# Patient Record
Sex: MALE | Race: WHITE | NOT HISPANIC OR LATINO | Employment: OTHER | URBAN - METROPOLITAN AREA
[De-identification: names, ages, dates, MRNs, and addresses within clinical notes are randomized per-mention and may not be internally consistent; named-entity substitution may affect disease eponyms.]

---

## 2017-02-02 PROBLEM — E66.9 OBESITY: Status: ACTIVE | Noted: 2017-02-02

## 2017-10-02 PROBLEM — M10.9 GOUT: Status: ACTIVE | Noted: 2017-10-02

## 2018-09-14 PROBLEM — M10.9 GOUT: Status: RESOLVED | Noted: 2017-10-02 | Resolved: 2018-09-14

## 2019-10-01 PROBLEM — G47.33 OBSTRUCTIVE SLEEP APNEA SYNDROME: Status: ACTIVE | Noted: 2019-10-01

## 2019-10-24 PROBLEM — R25.1 TREMORS OF NERVOUS SYSTEM: Status: ACTIVE | Noted: 2019-10-24

## 2020-03-25 PROBLEM — G20.A1 PARKINSON'S DISEASE (HCC): Status: ACTIVE | Noted: 2020-02-10

## 2022-11-27 ENCOUNTER — APPOINTMENT (OUTPATIENT)
Dept: RADIOLOGY | Facility: MEDICAL CENTER | Age: 67
End: 2022-11-27
Attending: STUDENT IN AN ORGANIZED HEALTH CARE EDUCATION/TRAINING PROGRAM
Payer: COMMERCIAL

## 2022-11-27 ENCOUNTER — APPOINTMENT (OUTPATIENT)
Dept: RADIOLOGY | Facility: MEDICAL CENTER | Age: 67
End: 2022-11-27
Attending: EMERGENCY MEDICINE
Payer: COMMERCIAL

## 2022-11-27 ENCOUNTER — HOSPITAL ENCOUNTER (OUTPATIENT)
Facility: MEDICAL CENTER | Age: 67
End: 2022-11-28
Attending: EMERGENCY MEDICINE | Admitting: STUDENT IN AN ORGANIZED HEALTH CARE EDUCATION/TRAINING PROGRAM
Payer: COMMERCIAL

## 2022-11-27 DIAGNOSIS — G20.A1 PARKINSON'S DISEASE (HCC): ICD-10-CM

## 2022-11-27 DIAGNOSIS — R55 SYNCOPE, UNSPECIFIED SYNCOPE TYPE: ICD-10-CM

## 2022-11-27 PROBLEM — Z66 DNR (DO NOT RESUSCITATE): Status: ACTIVE | Noted: 2022-11-27

## 2022-11-27 PROBLEM — K59.01 SLOW TRANSIT CONSTIPATION: Status: ACTIVE | Noted: 2022-11-27

## 2022-11-27 PROBLEM — R68.89 SPELLS OF DECREASED ATTENTIVENESS: Status: ACTIVE | Noted: 2022-11-27

## 2022-11-27 LAB
ALBUMIN SERPL BCP-MCNC: 4.6 G/DL (ref 3.2–4.9)
ALBUMIN/GLOB SERPL: 1.5 G/DL
ALP SERPL-CCNC: 64 U/L (ref 30–99)
ALT SERPL-CCNC: 19 U/L (ref 2–50)
ANION GAP SERPL CALC-SCNC: 13 MMOL/L (ref 7–16)
AST SERPL-CCNC: 17 U/L (ref 12–45)
BASOPHILS # BLD AUTO: 1 % (ref 0–1.8)
BASOPHILS # BLD: 0.05 K/UL (ref 0–0.12)
BILIRUB SERPL-MCNC: 0.7 MG/DL (ref 0.1–1.5)
BUN SERPL-MCNC: 21 MG/DL (ref 8–22)
CALCIUM SERPL-MCNC: 9.7 MG/DL (ref 8.5–10.5)
CHLORIDE SERPL-SCNC: 103 MMOL/L (ref 96–112)
CO2 SERPL-SCNC: 24 MMOL/L (ref 20–33)
CREAT SERPL-MCNC: 1.23 MG/DL (ref 0.5–1.4)
EKG IMPRESSION: NORMAL
EOSINOPHIL # BLD AUTO: 0.27 K/UL (ref 0–0.51)
EOSINOPHIL NFR BLD: 5.2 % (ref 0–6.9)
ERYTHROCYTE [DISTWIDTH] IN BLOOD BY AUTOMATED COUNT: 42.7 FL (ref 35.9–50)
GFR SERPLBLD CREATININE-BSD FMLA CKD-EPI: 64 ML/MIN/1.73 M 2
GLOBULIN SER CALC-MCNC: 3.1 G/DL (ref 1.9–3.5)
GLUCOSE SERPL-MCNC: 92 MG/DL (ref 65–99)
HCT VFR BLD AUTO: 43.3 % (ref 42–52)
HGB BLD-MCNC: 14.2 G/DL (ref 14–18)
IMM GRANULOCYTES # BLD AUTO: 0.02 K/UL (ref 0–0.11)
IMM GRANULOCYTES NFR BLD AUTO: 0.4 % (ref 0–0.9)
LYMPHOCYTES # BLD AUTO: 1.59 K/UL (ref 1–4.8)
LYMPHOCYTES NFR BLD: 30.8 % (ref 22–41)
MCH RBC QN AUTO: 28.5 PG (ref 27–33)
MCHC RBC AUTO-ENTMCNC: 32.8 G/DL (ref 33.7–35.3)
MCV RBC AUTO: 86.9 FL (ref 81.4–97.8)
MONOCYTES # BLD AUTO: 0.33 K/UL (ref 0–0.85)
MONOCYTES NFR BLD AUTO: 6.4 % (ref 0–13.4)
NEUTROPHILS # BLD AUTO: 2.9 K/UL (ref 1.82–7.42)
NEUTROPHILS NFR BLD: 56.2 % (ref 44–72)
NRBC # BLD AUTO: 0 K/UL
NRBC BLD-RTO: 0 /100 WBC
PLATELET # BLD AUTO: 214 K/UL (ref 164–446)
PMV BLD AUTO: 9.8 FL (ref 9–12.9)
POTASSIUM SERPL-SCNC: 4.2 MMOL/L (ref 3.6–5.5)
PROT SERPL-MCNC: 7.7 G/DL (ref 6–8.2)
RBC # BLD AUTO: 4.98 M/UL (ref 4.7–6.1)
SODIUM SERPL-SCNC: 140 MMOL/L (ref 135–145)
TROPONIN T SERPL-MCNC: 7 NG/L (ref 6–19)
TROPONIN T SERPL-MCNC: 9 NG/L (ref 6–19)
WBC # BLD AUTO: 5.2 K/UL (ref 4.8–10.8)

## 2022-11-27 PROCEDURE — 93005 ELECTROCARDIOGRAM TRACING: CPT | Performed by: EMERGENCY MEDICINE

## 2022-11-27 PROCEDURE — G0378 HOSPITAL OBSERVATION PER HR: HCPCS

## 2022-11-27 PROCEDURE — 71045 X-RAY EXAM CHEST 1 VIEW: CPT

## 2022-11-27 PROCEDURE — 93005 ELECTROCARDIOGRAM TRACING: CPT

## 2022-11-27 PROCEDURE — 85025 COMPLETE CBC W/AUTO DIFF WBC: CPT

## 2022-11-27 PROCEDURE — A9576 INJ PROHANCE MULTIPACK: HCPCS | Performed by: STUDENT IN AN ORGANIZED HEALTH CARE EDUCATION/TRAINING PROGRAM

## 2022-11-27 PROCEDURE — 80053 COMPREHEN METABOLIC PANEL: CPT

## 2022-11-27 PROCEDURE — 700102 HCHG RX REV CODE 250 W/ 637 OVERRIDE(OP): Performed by: STUDENT IN AN ORGANIZED HEALTH CARE EDUCATION/TRAINING PROGRAM

## 2022-11-27 PROCEDURE — 36415 COLL VENOUS BLD VENIPUNCTURE: CPT

## 2022-11-27 PROCEDURE — 70450 CT HEAD/BRAIN W/O DYE: CPT

## 2022-11-27 PROCEDURE — 84484 ASSAY OF TROPONIN QUANT: CPT

## 2022-11-27 PROCEDURE — 70553 MRI BRAIN STEM W/O & W/DYE: CPT

## 2022-11-27 PROCEDURE — A9270 NON-COVERED ITEM OR SERVICE: HCPCS | Performed by: STUDENT IN AN ORGANIZED HEALTH CARE EDUCATION/TRAINING PROGRAM

## 2022-11-27 PROCEDURE — 99220 PR INITIAL OBSERVATION CARE,LEVL III: CPT | Performed by: STUDENT IN AN ORGANIZED HEALTH CARE EDUCATION/TRAINING PROGRAM

## 2022-11-27 PROCEDURE — 700117 HCHG RX CONTRAST REV CODE 255: Performed by: STUDENT IN AN ORGANIZED HEALTH CARE EDUCATION/TRAINING PROGRAM

## 2022-11-27 PROCEDURE — 93971 EXTREMITY STUDY: CPT | Mod: LT

## 2022-11-27 PROCEDURE — 99285 EMERGENCY DEPT VISIT HI MDM: CPT

## 2022-11-27 RX ORDER — POLYETHYLENE GLYCOL 3350 17 G/17G
1 POWDER, FOR SOLUTION ORAL DAILY
Status: DISCONTINUED | OUTPATIENT
Start: 2022-11-27 | End: 2022-11-28 | Stop reason: HOSPADM

## 2022-11-27 RX ORDER — LORAZEPAM 2 MG/1
2 TABLET ORAL ONCE
Status: COMPLETED | OUTPATIENT
Start: 2022-11-27 | End: 2022-11-27

## 2022-11-27 RX ORDER — ACETAMINOPHEN 325 MG/1
650 TABLET ORAL EVERY 6 HOURS PRN
Status: DISCONTINUED | OUTPATIENT
Start: 2022-11-27 | End: 2022-11-28 | Stop reason: HOSPADM

## 2022-11-27 RX ORDER — ATORVASTATIN CALCIUM 10 MG/1
10 TABLET, FILM COATED ORAL DAILY
Status: DISCONTINUED | OUTPATIENT
Start: 2022-11-28 | End: 2022-11-27

## 2022-11-27 RX ORDER — POLYETHYLENE GLYCOL 3350 17 G/17G
17 POWDER, FOR SOLUTION ORAL DAILY
COMMUNITY
End: 2023-09-21

## 2022-11-27 RX ORDER — POLYETHYLENE GLYCOL 3350 17 G/17G
1 POWDER, FOR SOLUTION ORAL
Status: DISCONTINUED | OUTPATIENT
Start: 2022-11-27 | End: 2022-11-28 | Stop reason: HOSPADM

## 2022-11-27 RX ORDER — TRAZODONE HYDROCHLORIDE 50 MG/1
50 TABLET ORAL
Status: DISCONTINUED | OUTPATIENT
Start: 2022-11-27 | End: 2022-11-28 | Stop reason: HOSPADM

## 2022-11-27 RX ORDER — AMOXICILLIN 250 MG
2 CAPSULE ORAL 2 TIMES DAILY
Status: DISCONTINUED | OUTPATIENT
Start: 2022-11-27 | End: 2022-11-28 | Stop reason: HOSPADM

## 2022-11-27 RX ORDER — ENOXAPARIN SODIUM 100 MG/ML
40 INJECTION SUBCUTANEOUS DAILY
Status: DISCONTINUED | OUTPATIENT
Start: 2022-11-27 | End: 2022-11-28 | Stop reason: HOSPADM

## 2022-11-27 RX ORDER — BISACODYL 10 MG
10 SUPPOSITORY, RECTAL RECTAL
Status: DISCONTINUED | OUTPATIENT
Start: 2022-11-27 | End: 2022-11-28 | Stop reason: HOSPADM

## 2022-11-27 RX ORDER — ATORVASTATIN CALCIUM 10 MG/1
10 TABLET, FILM COATED ORAL EVERY EVENING
Status: DISCONTINUED | OUTPATIENT
Start: 2022-11-27 | End: 2022-11-28 | Stop reason: HOSPADM

## 2022-11-27 RX ORDER — IBUPROFEN 800 MG/1
800 TABLET ORAL EVERY EVENING
Status: DISCONTINUED | OUTPATIENT
Start: 2022-11-27 | End: 2022-11-28 | Stop reason: HOSPADM

## 2022-11-27 RX ADMIN — ATORVASTATIN CALCIUM 10 MG: 10 TABLET, FILM COATED ORAL at 22:50

## 2022-11-27 RX ADMIN — GADOTERIDOL 20 ML: 279.3 INJECTION, SOLUTION INTRAVENOUS at 20:03

## 2022-11-27 RX ADMIN — IBUPROFEN 800 MG: 800 TABLET, FILM COATED ORAL at 22:50

## 2022-11-27 RX ADMIN — LORAZEPAM 2 MG: 2 TABLET ORAL at 18:42

## 2022-11-27 RX ADMIN — TRAZODONE HYDROCHLORIDE 50 MG: 50 TABLET ORAL at 22:50

## 2022-11-27 ASSESSMENT — LIFESTYLE VARIABLES
AVERAGE NUMBER OF DAYS PER WEEK YOU HAVE A DRINK CONTAINING ALCOHOL: 0
EVER FELT BAD OR GUILTY ABOUT YOUR DRINKING: NO
ALCOHOL_USE: YES
HOW MANY TIMES IN THE PAST YEAR HAVE YOU HAD 5 OR MORE DRINKS IN A DAY: 0
HAVE YOU EVER FELT YOU SHOULD CUT DOWN ON YOUR DRINKING: NO
ON A TYPICAL DAY WHEN YOU DRINK ALCOHOL HOW MANY DRINKS DO YOU HAVE: 1
TOTAL SCORE: 0
TOTAL SCORE: 0
DOES PATIENT WANT TO STOP DRINKING: NO
EVER HAD A DRINK FIRST THING IN THE MORNING TO STEADY YOUR NERVES TO GET RID OF A HANGOVER: NO
CONSUMPTION TOTAL: NEGATIVE
HAVE PEOPLE ANNOYED YOU BY CRITICIZING YOUR DRINKING: NO
TOTAL SCORE: 0

## 2022-11-27 ASSESSMENT — ENCOUNTER SYMPTOMS
FOCAL WEAKNESS: 0
BRUISES/BLEEDS EASILY: 0
SORE THROAT: 0
SHORTNESS OF BREATH: 0
VOMITING: 0
DIARRHEA: 0
NECK PAIN: 0
HEADACHES: 0
COUGH: 0
ABDOMINAL PAIN: 0
BACK PAIN: 0
WEAKNESS: 1
DIZZINESS: 0
FEVER: 0
MYALGIAS: 1
EYE REDNESS: 0
SEIZURES: 1
NAUSEA: 0
CHILLS: 0
MEMORY LOSS: 0
CONSTIPATION: 0
LOSS OF CONSCIOUSNESS: 1
BLURRED VISION: 0
DOUBLE VISION: 0
PALPITATIONS: 0
NERVOUS/ANXIOUS: 0
INSOMNIA: 1

## 2022-11-27 ASSESSMENT — FIBROSIS 4 INDEX: FIB4 SCORE: 1.24

## 2022-11-27 ASSESSMENT — PATIENT HEALTH QUESTIONNAIRE - PHQ9
2. FEELING DOWN, DEPRESSED, IRRITABLE, OR HOPELESS: NOT AT ALL
1. LITTLE INTEREST OR PLEASURE IN DOING THINGS: NOT AT ALL
SUM OF ALL RESPONSES TO PHQ9 QUESTIONS 1 AND 2: 0

## 2022-11-27 NOTE — ED NOTES
Chief Complaint   Patient presents with    Near Syncopal     X3 with excessive activities such as PT     Pt wheeled to triage with above complaints [id number]. Pt going to PT for his left knee. They noted his blood pressure has been high after the near syncopal episodes.   Pt to ekg.

## 2022-11-27 NOTE — ED NOTES
Pt resting in room, easy, equal chest rise and fall. Pt remains calm and cooperative. Family at bedside

## 2022-11-27 NOTE — PROGRESS NOTES
Brief phone conversation with ERP    67-year-old male known Parkinson disease has been having episodes of syncope or seizures for the past few months now.  Had 3 episodes.  All of them seem to be related with some type of physical activity such as standing or during PT sessions.  Last 1 was this past week he had some tongue biting with it.  At this time it is unclear with his episodes are.  Could be syncope or seizures.  Patients with Parkinson's can get autonomic dysfunction.  I agree with the ERP's plan to have the patient admitted by the hospital service and do a syncope work-up which includes an MRI brain with without contrast, EEG, echo, orthostatic checks, and telemetry.  If anything is positive or the team has any further questions please reach out to neurology.  Would not empirically treat for seizures at this time.  If everything is unremarkable patient can follow-up outpatient neurology.

## 2022-11-27 NOTE — ED NOTES
Med rec completed per patient at bedside.    No abx in the last 30 days.    Allergies reviewed.    Home pharmacy is [facility] in [place].

## 2022-11-27 NOTE — ED PROVIDER NOTES
ED Provider Note    CHIEF COMPLAINT  Chief Complaint   Patient presents with    Near Syncopal     X3 with excessive activities such as PT       HPI  Erasto Aguilar is a 67 y.o. male with a history of Parkinson's who presents to the emergency department following reported syncopal episode.  The patient has had 3 of these episodes over the last few months.  He recently had surgery for a quadriceps muscle repair in August.  The first episode happened after he was walking around the house in September and thought would just to be related with exertion.  He has had 2 other episodes during physical therapy the last happened last Tuesday.  Initially it was described as syncope with maybe some gargling.  This last episode on Tuesday included tongue biting and urinary incontinence as well as the right side of the body being stiff.  The patient has no history of seizures in the past.  He denies any headache, chest pain, shortness of breath.  He is seen by a neurologist at [facility] in [place] however now lives in [place] and has not established with a neurologist    REVIEW OF SYSTEMS  See HPI for further details.   Review of Systems   Constitutional:  Negative for chills and fever.   HENT:  Negative for sore throat.    Eyes:  Negative for blurred vision and redness.   Respiratory:  Negative for cough and shortness of breath.    Cardiovascular:  Negative for chest pain and leg swelling.   Gastrointestinal:  Negative for abdominal pain and vomiting.   Genitourinary:  Negative for dysuria and urgency.   Musculoskeletal:  Negative for back pain and neck pain.   Skin:  Negative for rash.   Neurological:  Positive for seizures and loss of consciousness. Negative for focal weakness and headaches.   Psychiatric/Behavioral:  Negative for suicidal ideas.        PAST MEDICAL HISTORY   has a past medical history of Bursitis, Hyperlipidemia, Muscle disorder, and Parkinson disease (HCC).    SOCIAL HISTORY  Social History  "    Tobacco Use    Smoking status: Never    Smokeless tobacco: Never   Vaping Use    Vaping Use: Never used   Substance and Sexual Activity    Alcohol use: Not Currently     Alcohol/week: 0.0 oz     Comment: occasionallyy    Drug use: No    Sexual activity: Not on file       SURGICAL HISTORY   has a past surgical history that includes cholecystectomy and hamstring tendon repair (Left, 8/25/2022).    CURRENT MEDICATIONS  Home Medications       Reviewed by Jeff Ny (Pharmacy Intern) on 11/27/22 at 1407  Med List Status: Complete     Medication Last Dose Status   atorvastatin (LIPITOR) 10 MG Tab 11/26/2022 Active   ibuprofen (MOTRIN) 800 MG Tab 11/26/2022 Active   polyethylene glycol/lytes (MIRALAX) 17 g Pack 11/25/2022 Active                    ALLERGIES  Allergies   Allergen Reactions    Pcn [Penicillins] Anaphylaxis     Had throat swelling with penicillin around 30 years ago.       PHYSICAL EXAM   VITAL SIGNS: BP (!) 142/78   Pulse 67   Temp 36.1 °C (97 °F) (Temporal)   Resp 13   Ht 1.854 m (6' 1\")   Wt 102 kg (225 lb)   SpO2 99%   BMI 29.69 kg/m²      Physical Exam  Constitutional:       General: He is not in acute distress.     Comments: Nontoxic appearing male   HENT:      Head: Normocephalic and atraumatic.   Eyes:      Conjunctiva/sclera: Conjunctivae normal.      Pupils: Pupils are equal, round, and reactive to light.   Cardiovascular:      Rate and Rhythm: Normal rate and regular rhythm.      Heart sounds: Normal heart sounds.   Pulmonary:      Effort: Pulmonary effort is normal. No respiratory distress.      Breath sounds: Normal breath sounds.   Abdominal:      General: There is no distension.      Palpations: Abdomen is soft.      Tenderness: There is no abdominal tenderness.   Musculoskeletal:         General: No tenderness. Normal range of motion.      Cervical back: Normal range of motion and neck supple.   Skin:     General: Skin is warm and dry.   Neurological:      Mental Status: " He is alert and oriented to person, place, and time.      Comments: Moving all extremities spontaneously   Psychiatric:         Mood and Affect: Affect normal.         DIAGNOSTIC STUDIES    EKG  Results for orders placed or performed during the hospital encounter of 22   EKG (NOW)   Result Value Ref Range    Report       [facility] Emergency Dept.    Test Date:  2022  Pt Name:    MISTY MAGALLON                 Department: ER  MRN:        [id number]                      Room:  Gender:     Male                         Technician: [id number]  :        1955                   Requested By:ER TRIAGE PROTOCOL  Order #:    [id number]                    Reading MD: Luz Turner MD    Measurements  Intervals                                Axis  Rate:       59                           P:          67  AL:         147                          QRS:        65  QRSD:       108                          T:          63  QT:         430  QTc:        426    Interpretive Statements  Sinus bradycardia  Normal intervals, no ectopy  No ST or T wave change  No previous ECG available for comparison  Electronically Signed On 2022 13:44:44 PST by Luz Turner MD             LABS  Personally reviewed by me  Labs Reviewed   CBC WITH DIFFERENTIAL - Abnormal; Notable for the following components:       Result Value    MCHC 32.8 (*)     All other components within normal limits    Narrative:     Biotin intake of greater than 5 mg per day may interfere with  troponin levels, causing false low values.   COMP METABOLIC PANEL    Narrative:     Biotin intake of greater than 5 mg per day may interfere with  troponin levels, causing false low values.   TROPONIN    Narrative:     Biotin intake of greater than 5 mg per day may interfere with  troponin levels, causing false low values.   TROPONIN    Narrative:     Biotin intake of greater than 5 mg per day may interfere with  troponin levels, causing false low values.    ESTIMATED GFR    Narrative:     Biotin intake of greater than 5 mg per day may interfere with  troponin levels, causing false low values.   D-DIMER           RADIOLOGY  Personally reviewed by me  US-EXTREMITY VENOUS LOWER UNILAT LEFT   Final Result      CT-HEAD W/O   Final Result         1. No acute intracranial abnormality. No evidence of acute intracranial hemorrhage or mass lesion.                     DX-CHEST-PORTABLE (1 VIEW)   Final Result         1. No acute cardiopulmonary abnormalities are identified.      MR-BRAIN-WITH & W/O    (Results Pending)   EC-ECHOCARDIOGRAM COMPLETE W/O CONT    (Results Pending)         ED COURSE  Vitals:    11/27/22 1401 11/27/22 1501 11/27/22 1701 11/27/22 1801   BP: (!) 155/86 134/82 (!) 152/76 (!) 142/78   Pulse: 75 66 68 67   Resp: 13 16 13 13   Temp:       TempSrc:       SpO2: 100% 96% 96% 99%   Weight:       Height:             Medications administered:        Old records personally reviewed:  Reviewed physical therapy notes which states that the patient felt dizzy prior to this episode.  Blood pressure and heart rate were borderline low following the episode, did have urinary incontinence        MEDICAL DECISION MAKING  Patient with history of Parkinson's who presents after multiple episodes of possible syncope over the last 2 months, mainly occurring with some activity or physical therapy.  He is afebrile with reassuring vital signs on arrival.  His EKG does not show evidence of ischemia or arrhythmia.  No signs of prolonged QT, WPW, HOCM, Brugada.  Labs are reassuring without electrolyte abnormality or anemia.  Troponin is normal making ACS unlikely.  He does not have any symptoms suggestive of pulmonary embolism.  Imaging without evidence of intracranial hemorrhage or mass.  History was initially somewhat concerning for seizure as he reported tongue biting and incontinence however then after more history seems like had a prodrome to these events and only occurring with  exertion which would go along more with syncope.    I did discuss the case with Dr. Shetty briefly with neurology.  He feels that the description of symptoms seem more related with syncope rather than seizure.  He feels it is reasonable to obtain an MRI of the brain and an EEG and consult neurology if abnormal.  He may have autonomic dysreflexia related to his Parkinson's disease.    Upon reassessment, patient is resting comfortably with normal vital signs.  No new complaints at this time.  Discussed results with patient and/or family as well as plan of care for admission for further work-up of recurrent syncope versus seizure.  Patient is agreeable at this time.  I discussed the case with Dr. Salas, hospitalist on-call, who accepts admission of the patient.        IMPRESSION  (R55) Syncope, unspecified syncope type      Disposition: Admit medicine, guarded condition    The patient is referred to a primary physician for blood pressure management, diabetic screening, and for all other preventative health concerns.    New Prescriptions    No medications on file           Electronically signed by: Luz Turner M.D., 11/27/2022 1:45 PM

## 2022-11-28 ENCOUNTER — APPOINTMENT (OUTPATIENT)
Dept: CARDIOLOGY | Facility: MEDICAL CENTER | Age: 67
End: 2022-11-28
Attending: STUDENT IN AN ORGANIZED HEALTH CARE EDUCATION/TRAINING PROGRAM
Payer: COMMERCIAL

## 2022-11-28 VITALS
HEART RATE: 57 BPM | OXYGEN SATURATION: 100 % | HEIGHT: 73 IN | SYSTOLIC BLOOD PRESSURE: 128 MMHG | TEMPERATURE: 98.6 F | BODY MASS INDEX: 27.17 KG/M2 | RESPIRATION RATE: 17 BRPM | WEIGHT: 205.03 LBS | DIASTOLIC BLOOD PRESSURE: 60 MMHG

## 2022-11-28 LAB
ALBUMIN SERPL BCP-MCNC: 3.6 G/DL (ref 3.2–4.9)
ALBUMIN/GLOB SERPL: 1.4 G/DL
ALP SERPL-CCNC: 52 U/L (ref 30–99)
ALT SERPL-CCNC: 15 U/L (ref 2–50)
ANION GAP SERPL CALC-SCNC: 12 MMOL/L (ref 7–16)
APPEARANCE UR: CLEAR
AST SERPL-CCNC: 13 U/L (ref 12–45)
BILIRUB SERPL-MCNC: 0.5 MG/DL (ref 0.1–1.5)
BILIRUB UR QL STRIP.AUTO: NEGATIVE
BUN SERPL-MCNC: 20 MG/DL (ref 8–22)
CALCIUM SERPL-MCNC: 8.9 MG/DL (ref 8.5–10.5)
CHLORIDE SERPL-SCNC: 105 MMOL/L (ref 96–112)
CO2 SERPL-SCNC: 22 MMOL/L (ref 20–33)
COLOR UR: YELLOW
CREAT SERPL-MCNC: 1.22 MG/DL (ref 0.5–1.4)
D DIMER PPP IA.FEU-MCNC: 1.52 UG/ML (FEU) (ref 0–0.5)
GFR SERPLBLD CREATININE-BSD FMLA CKD-EPI: 64 ML/MIN/1.73 M 2
GLOBULIN SER CALC-MCNC: 2.5 G/DL (ref 1.9–3.5)
GLUCOSE SERPL-MCNC: 95 MG/DL (ref 65–99)
GLUCOSE UR STRIP.AUTO-MCNC: NEGATIVE MG/DL
KETONES UR STRIP.AUTO-MCNC: NEGATIVE MG/DL
LEUKOCYTE ESTERASE UR QL STRIP.AUTO: NEGATIVE
LV EJECT FRACT  99904: 65
LV EJECT FRACT MOD 2C 99903: 61.58
LV EJECT FRACT MOD 4C 99902: 68.35
LV EJECT FRACT MOD BP 99901: 65.87
MAGNESIUM SERPL-MCNC: 2.1 MG/DL (ref 1.5–2.5)
MICRO URNS: NORMAL
NITRITE UR QL STRIP.AUTO: NEGATIVE
PH UR STRIP.AUTO: 6 [PH] (ref 5–8)
POTASSIUM SERPL-SCNC: 3.7 MMOL/L (ref 3.6–5.5)
PROT SERPL-MCNC: 6.1 G/DL (ref 6–8.2)
PROT UR QL STRIP: NEGATIVE MG/DL
RBC UR QL AUTO: NEGATIVE
SODIUM SERPL-SCNC: 139 MMOL/L (ref 135–145)
SP GR UR STRIP.AUTO: >=1.03
UROBILINOGEN UR STRIP.AUTO-MCNC: 0.2 MG/DL

## 2022-11-28 PROCEDURE — 93306 TTE W/DOPPLER COMPLETE: CPT | Mod: 26 | Performed by: INTERNAL MEDICINE

## 2022-11-28 PROCEDURE — 81003 URINALYSIS AUTO W/O SCOPE: CPT

## 2022-11-28 PROCEDURE — 80053 COMPREHEN METABOLIC PANEL: CPT

## 2022-11-28 PROCEDURE — 700105 HCHG RX REV CODE 258: Performed by: INTERNAL MEDICINE

## 2022-11-28 PROCEDURE — 93306 TTE W/DOPPLER COMPLETE: CPT

## 2022-11-28 PROCEDURE — 85379 FIBRIN DEGRADATION QUANT: CPT

## 2022-11-28 PROCEDURE — 95819 EEG AWAKE AND ASLEEP: CPT | Mod: 26 | Performed by: STUDENT IN AN ORGANIZED HEALTH CARE EDUCATION/TRAINING PROGRAM

## 2022-11-28 PROCEDURE — 83735 ASSAY OF MAGNESIUM: CPT

## 2022-11-28 PROCEDURE — 95819 EEG AWAKE AND ASLEEP: CPT | Performed by: STUDENT IN AN ORGANIZED HEALTH CARE EDUCATION/TRAINING PROGRAM

## 2022-11-28 PROCEDURE — 99217 PR OBSERVATION CARE DISCHARGE: CPT | Performed by: INTERNAL MEDICINE

## 2022-11-28 PROCEDURE — G0378 HOSPITAL OBSERVATION PER HR: HCPCS

## 2022-11-28 RX ORDER — SODIUM CHLORIDE, SODIUM LACTATE, POTASSIUM CHLORIDE, AND CALCIUM CHLORIDE .6; .31; .03; .02 G/100ML; G/100ML; G/100ML; G/100ML
1000 INJECTION, SOLUTION INTRAVENOUS ONCE
Status: COMPLETED | OUTPATIENT
Start: 2022-11-28 | End: 2022-11-28

## 2022-11-28 RX ADMIN — SODIUM CHLORIDE, POTASSIUM CHLORIDE, SODIUM LACTATE AND CALCIUM CHLORIDE 1000 ML: 600; 310; 30; 20 INJECTION, SOLUTION INTRAVENOUS at 08:30

## 2022-11-28 ASSESSMENT — PATIENT HEALTH QUESTIONNAIRE - PHQ9
1. LITTLE INTEREST OR PLEASURE IN DOING THINGS: NOT AT ALL
SUM OF ALL RESPONSES TO PHQ9 QUESTIONS 1 AND 2: 0
2. FEELING DOWN, DEPRESSED, IRRITABLE, OR HOPELESS: NOT AT ALL

## 2022-11-28 ASSESSMENT — PAIN DESCRIPTION - PAIN TYPE: TYPE: ACUTE PAIN

## 2022-11-28 NOTE — PROGRESS NOTES
Received report from  Rohcelle PEREZ via phone. Transported patient up to unit on [facility] on tele monitor. Patient assessment completed. Safety precautions in place. Patient is A&O X 4 and neuro check complete with NIHS assessment done.

## 2022-11-28 NOTE — CARE PLAN
The patient is Watcher - Medium risk of patient condition declining or worsening    Shift Goals  Clinical Goals: Neuro checks, EEG, Seizure precautions  Patient Goals: Comfort, rest    Progress made toward(s) clinical / shift goals:  Safety precautions and seizure precautions in place. IV fluids started for hydration. UA sent to lab.     Patient is not progressing towards the following goals:

## 2022-11-28 NOTE — ASSESSMENT & PLAN NOTE
Patient reports having developed constipation following his previous surgery.    Continue home MiraLAX

## 2022-11-28 NOTE — DISCHARGE SUMMARY
Discharge Summary    CHIEF COMPLAINT ON ADMISSION  Chief Complaint   Patient presents with    Near Syncopal     X3 with excessive activities such as PT       Reason for Admission  Syncope      Admission Date  11/27/2022    CODE STATUS  DNAR/DNI    HPI & HOSPITAL COURSE  This is a 67 y.o. male with a past medical history of Parkinson's disease who presented here with episodes of syncope.  His episodes occurred while he was exerting himself during physical therapy.  Patient was placed in observation with neurochecks and continuous cardiac monitoring to evaluate for syncope versus seizures.  Neurology was consulted.  Patient underwent an MRI brain that was negative for acute process.  He underwent a 2D echo that revealed LVEF 65%.  His troponins remain negative.  Cardiac monitor revealed sinus rhythm.  EEG revealed no abnormal epileptiform activity.  Patient did not have any episodes of syncope or seizure while he was in the hospital.  His symptoms are likely secondary to autonomic dysfunction in the setting of Parkinson's.  He was instructed on lifestyle modifications including increasing his fluid and salt intake, wearing compressive stockings, managing pain and anxiety, lower extremity muscle tensing and leg crossing for muscle pumping to improve venous return.  He has also been given a referral to neurology movement clinic      Therefore, he is discharged in good and stable condition to home with close outpatient follow-up.    The patient recovered much more quickly than anticipated on admission.    Discharge Date  11/28/22    FOLLOW UP ITEMS POST DISCHARGE  Follow up with PCP and Neurology    DISCHARGE DIAGNOSES  Principal Problem:    Spells of decreased attentiveness POA: Yes  Active Problems:    Hyperlipidemia POA: Yes    Parkinson's disease (HCC) POA: Yes      [facility]: [facility] [place]    Slow transit constipation POA: Yes    DNR (do not resuscitate) POA: Yes  Resolved Problems:    * No resolved hospital  problems. *      FOLLOW UP  Future Appointments   Date Time Provider Department Center   11/29/2022  1:00 PM Romy Gusman, PT [facility] None   12/1/2022  4:00 PM Romy Gusman PT [facility] None   12/2/2022  4:00 PM Romy Gusman, PT [facility] None     No follow-up provider specified.    MEDICATIONS ON DISCHARGE     Medication List        CONTINUE taking these medications        Instructions   atorvastatin 10 MG Tabs  Commonly known as: LIPITOR   TAKE 1 TABLET BY MOUTH EVERY DAY  Dose: 10 mg     ibuprofen 800 MG Tabs  Commonly known as: MOTRIN   Take 800 mg by mouth every day.  Dose: 800 mg     polyethylene glycol/lytes 17 g Pack  Commonly known as: MIRALAX   Take 17 g by mouth every day.  Dose: 17 g              Allergies  Allergies   Allergen Reactions    Pcn [Penicillins] Anaphylaxis     Had throat swelling with penicillin around 30 years ago.       DIET  Orders Placed This Encounter   Procedures    Diet Order Diet: Regular     Standing Status:   Standing     Number of Occurrences:   1     Order Specific Question:   Diet:     Answer:   Regular [1]       ACTIVITY  As tolerated.  Weight bearing as tolerated    CONSULTATIONS  Neurology Dr Shetty    PROCEDURES  None    LABORATORY  Lab Results   Component Value Date    SODIUM 139 11/28/2022    POTASSIUM 3.7 11/28/2022    CHLORIDE 105 11/28/2022    CO2 22 11/28/2022    GLUCOSE 95 11/28/2022    BUN 20 11/28/2022    CREATININE 1.22 11/28/2022        Lab Results   Component Value Date    WBC 5.2 11/27/2022    HEMOGLOBIN 14.2 11/27/2022    HEMATOCRIT 43.3 11/27/2022    PLATELETCT 214 11/27/2022        Total time of the discharge process exceeds 34 minutes.

## 2022-11-28 NOTE — PROCEDURES
INPATIENT ROUTINE VIDEO ELECTROENCEPHALOGRAM REPORT      REFERRING PROVIDER: Dr. Salas    DOS: 11/28/2022     TOTAL RECORDING TIME: 0 hours and 25 minutes of total recording time    INDICATION:  Erasto Aguilar 67 y.o. male presenting with  left sided body stiffening/shaking and altered mental status    CURRENT ANTI-SEIZURE MEDICATIONS:   No AEDs    TECHNIQUE: Routine VEEG was set up by a Neurodiagnostic technologist who performed education to the patient and staff. A minimum of 23 electrodes and 23 channel recording was setup and performed by Neurodiagnostic technologist, in accordance with the international 10-20 system. The study was reviewed in bipolar and referential montages. The recording examined the patient in the  awake, drowsy, and sleep state(s).     DESCRIPTION OF THE RECORD:  During maximal wakefulness, the background was continuous, symmetrical, and showed a 8.5-9 Hz posterior dominant rhythm.  Reactivity and state changes were present.  During drowsiness, a loss of myogenic artifact and theta/delta frequencies were seen.   EEG Sleep: Sleep was captured and was characterized by diffuse background delta/theta activity with a loss of myogenic artifact.  N2 sleep transients in the form of sleep spindles and vertex waves were seen in the leads over the central regions.       ACTIVATION PROCEDURES:   Intermittent Photic stimulation was performed in a stepwise fashion from 1 to 30 Hz and did not elicited any abnormalities on EEG.     ICTAL AND INTERICTAL FINDINGS:   No focal or generalized epileptiform activity noted.     No regional slowing was seen during this routine study.      No definite electrographic or electroclinical seizures.     EKG: Sampling of the EKG recording showed sinus bradycardia with intermittent PVCs/PACs      EVENTS:  None    INTERPRETATION:   Normal video EEG recording in the awake, drowsy, and sleep state(s):  - No persistent focal asymmetries seen.  - Epileptiform discharges: No  epileptiform discharges or other epileptiform phenomena seen.   - No seizures. Clinical correlation is recommended.      Note: This EEG does not rule the possibility of seizures  If the clinical suspicion remains high for seizures, a prolonged recording to capture clinical or subclinical events may be helpful.        Fermin Sotelo MD  Department of Neurology at [facility]  General Neurologist and Epileptologist  Director of [facility]'s Level III Comprehensive Epilepsy Program  Professor of Clinical Neurology, [facility].   Phone: [phone]  Fax: [phone]  E-mail: [email or web address]

## 2022-11-28 NOTE — ASSESSMENT & PLAN NOTE
As per history.  Previously evaluated at [facility] by neurologist there.    Patient will need outpatient follow-up with neurology  I explained to the patient that due to our short staffing of neurologist in the hospital, they would unlikely be able to fully evaluate the patient during her hospitalization.

## 2022-11-28 NOTE — PROGRESS NOTES
Arrive to dc lounge via wheelchair. A/O, dc instructions reviewed w/ pt, verbalized understanding. Dc home w/ wife in stable condition.

## 2022-11-28 NOTE — PROGRESS NOTES
IV fluids started after am rounds for hydration. UA collected and sent to lab. Patient sitting up and watching videos in bed.

## 2022-11-28 NOTE — PROGRESS NOTES
4 Eyes Skin Assessment Completed by CHRIS Bey and CHRIS Roldan.    Head WDL  Ears WDL  Nose WDL  Mouth WDL  Neck WDL  Breast/Chest WDL  Shoulder Blades WDL  Spine WDL  (R) Arm/Elbow/Hand WDL  (L) Arm/Elbow/Hand WDL  Abdomen WDL  Groin WDL  Scrotum/Coccyx/Buttocks WDL  (R) Leg WDL  (L) Leg WDL  (R) Heel/Foot/Toe WDL  (L) Heel/Foot/Toe WDL          Devices In Places Pulse Ox      Interventions In Place Pillows and Pressure Redistribution Mattress    Possible Skin Injury No    Pictures Uploaded Into Epic N/A  Wound Consult Placed N/A  RN Wound Prevention Protocol Ordered No

## 2022-11-28 NOTE — H&P
Hospital Medicine History & Physical Note    Date of Service  11/27/2022    Primary Care Physician  SILKE Juarez    Consultants      Code Status  DNAR/DNI    Chief Complaint  Chief Complaint   Patient presents with    Near Syncopal     X3 with excessive activities such as PT       History of Presenting Illness  Erasto Aguilar is a 67 y.o. male who presented 11/27/2022 with spells.  This is a pleasant gentleman with a history of Parkinson's disease not on Sinemet, hyperlipidemia, and bursitis.  Patient is accompanied by his wife at bedside who assisted with providing some of the history.  Patient had a left quadriceps injury approximately 6 weeks ago, for which he had a surgical procedure.  He had been getting physical therapy.  They describe 3 different episodes of where the patient's right side locks and becomes stiff.  It lasts approximately 1 minute.  This was sometimes associated with tongue biting as well as loss of bladder control.  Patient reports that 2 out of the 3 episodes were associated with severe knee pain and also during physical therapy, where he was caught before he fell down.  Patient reports that he passed out he remembers working up after these episodes.  The last episode was approximately 5 days ago.  Patient has been previously seen by a neurologist at [facility] in [place].  He currently lives in [place] and has not his outpatient care with a neurologist.    Patient reports that he tried Sinemet in the past but had no significant improvement of his symptoms.  He developed worsening right upper extremity tremors following his surgery several weeks ago.  He reports taking THC gummy bears for sleep.  Patient reports that his mother had Parkinson's disease and his father had Alzheimer's disease.    The case was reviewed by ID neurologist on-call, who felt that it was related to patient's autonomic dysfunction and less likely seizures.  Recommended further work-up with MRI of  the brain with and without contrast, EEG, echocardiogram, orthostatic blood pressures, and continuous telemetry monitoring.      I discussed the plan of care with patient, family, , and ER physician Dr. Turner .    Review of Systems  Review of Systems   Constitutional:  Negative for chills and fever.   HENT:  Negative for ear pain and sore throat.    Eyes:  Negative for blurred vision and double vision.   Respiratory:  Negative for cough and shortness of breath.    Cardiovascular:  Positive for leg swelling. Negative for chest pain and palpitations.   Gastrointestinal:  Negative for abdominal pain, constipation, diarrhea, nausea and vomiting.   Genitourinary:  Positive for dysuria. Negative for frequency.   Musculoskeletal:  Positive for joint pain and myalgias.   Skin:  Negative for itching and rash.   Neurological:  Positive for weakness. Negative for dizziness and headaches.   Endo/Heme/Allergies:  Negative for environmental allergies. Does not bruise/bleed easily.   Psychiatric/Behavioral:  Negative for memory loss. The patient has insomnia. The patient is not nervous/anxious.      Past Medical History   has a past medical history of Bursitis, Hyperlipidemia, Muscle disorder, and Parkinson disease (HCC).    Surgical History   has a past surgical history that includes cholecystectomy and hamstring tendon repair (Left, 8/25/2022).     Family History  family history includes Alzheimer's Disease in his father; Parkinson's Disease in his mother.   Family history reviewed with patient. There is family history that is pertinent to the chief complaint.     Social History   reports that he has never smoked. He has never used smokeless tobacco. He reports that he does not currently use alcohol. He reports that he does not use drugs.    Allergies  Allergies   Allergen Reactions    Pcn [Penicillins] Anaphylaxis     Had throat swelling with penicillin around 30 years ago.       Medications  Prior to Admission  Medications   Prescriptions Last Dose Informant Patient Reported? Taking?   atorvastatin (LIPITOR) 10 MG Tab 11/26/2022 at PM Patient No No   Sig: TAKE 1 TABLET BY MOUTH EVERY DAY   ibuprofen (MOTRIN) 800 MG Tab 11/26/2022 at PM Patient Yes No   Sig: Take 800 mg by mouth every day.   polyethylene glycol/lytes (MIRALAX) 17 g Pack 11/25/2022 at [facility] Patient Yes Yes   Sig: Take 17 g by mouth every day.      Facility-Administered Medications: None       Physical Exam  Temp:  [36.1 °C (97 °F)] 36.1 °C (97 °F)  Pulse:  [64-75] 67  Resp:  [13-19] 13  BP: (134-155)/(76-92) 142/78  SpO2:  [95 %-100 %] 99 %  Blood Pressure : 134/82   Temperature: 36.1 °C (97 °F)   Pulse: 66   Respiration: 16   Pulse Oximetry: 96 %       Physical Exam  Vitals and nursing note reviewed.   Constitutional:       General: He is not in acute distress.     Appearance: Normal appearance. He is well-developed. He is not diaphoretic.   HENT:      Head: Normocephalic and atraumatic.      Right Ear: External ear normal.      Left Ear: External ear normal.      Nose: Nose normal.      Mouth/Throat:      Pharynx: Oropharynx is clear. No oropharyngeal exudate or posterior oropharyngeal erythema.   Eyes:      General: No scleral icterus.        Right eye: No discharge.         Left eye: No discharge.      Conjunctiva/sclera: Conjunctivae normal.      Pupils: Pupils are equal, round, and reactive to light.   Neck:      Thyroid: No thyromegaly.      Vascular: No JVD.      Trachea: No tracheal deviation.   Cardiovascular:      Rate and Rhythm: Normal rate and regular rhythm.      Heart sounds: Normal heart sounds. No murmur heard.    No friction rub. No gallop.   Pulmonary:      Effort: Pulmonary effort is normal. No respiratory distress.      Breath sounds: Normal breath sounds. No stridor. No wheezing or rales.   Abdominal:      General: Bowel sounds are normal. There is no distension.      Palpations: Abdomen is soft. There is no mass.      Tenderness: There  is no abdominal tenderness. There is no guarding or rebound.   Musculoskeletal:         General: No tenderness or deformity.      Cervical back: Neck supple. No tenderness.      Right lower leg: No edema.      Left lower leg: Edema present.   Lymphadenopathy:      Cervical: No cervical adenopathy.   Skin:     General: Skin is warm and dry.      Coloration: Skin is not pale.      Findings: No erythema or rash.   Neurological:      Mental Status: He is alert and oriented to person, place, and time.      Sensory: No sensory deficit.      Motor: Weakness (Diffuse weakness in the bilateral lower extremities) present. No abnormal muscle tone.      Comments: Masked facies with right upper extremity resting tremor and bradykinesia   Psychiatric:         Attention and Perception: Attention normal.         Mood and Affect: Affect is blunt and flat.         Speech: Speech normal.         Behavior: Behavior normal. Behavior is cooperative.         Thought Content: Thought content normal.         Cognition and Memory: Cognition and memory normal.         Judgment: Judgment normal.       Laboratory:  Recent Labs     11/27/22  0941   WBC 5.2   RBC 4.98   HEMOGLOBIN 14.2   HEMATOCRIT 43.3   MCV 86.9   MCH 28.5   MCHC 32.8*   RDW 42.7   PLATELETCT 214   MPV 9.8     Recent Labs     11/27/22  0941   SODIUM 140   POTASSIUM 4.2   CHLORIDE 103   CO2 24   GLUCOSE 92   BUN 21   CREATININE 1.23   CALCIUM 9.7     Recent Labs     11/27/22  0941   ALTSGPT 19   ASTSGOT 17   ALKPHOSPHAT 64   TBILIRUBIN 0.7   GLUCOSE 92         No results for input(s): NTPROBNP in the last 72 hours.      Recent Labs     11/27/22  0941 11/27/22  1252   TROPONINT 9 7       Imaging:  US-EXTREMITY VENOUS LOWER UNILAT LEFT   Final Result      CT-HEAD W/O   Final Result         1. No acute intracranial abnormality. No evidence of acute intracranial hemorrhage or mass lesion.                     DX-CHEST-PORTABLE (1 VIEW)   Final Result         1. No acute  cardiopulmonary abnormalities are identified.      MR-BRAIN-WITH & W/O    (Results Pending)   EC-ECHOCARDIOGRAM COMPLETE W/O CONT    (Results Pending)         Head CT per my read shows no intracranial hemorrhages or masses.  No hydrocephalus.      I have personally reviewed the patient's chest x-ray.  Per my read, clear lung volumes bilaterally with no significant interstitial or focal infiltrates.  Sharp costophrenic angles bilaterally.        EKG per my review shows sinus bradycardia with heart rate of 59, QTc 426, no significant ST elevation or depression.      Assessment/Plan:  Justification for Admission Status  I anticipate this patient is appropriate for observation status at this time because patient presents with recurrent spells consisting of syncope and right-sided stiffness in setting of progressing Parkinson's disease and weakness. There is concern for seizures and patient will need EEG monitoring as well as further work-up with MRI of the brain.    Patient will need a Med/Surg bed on NEUROLOGY service .  The need is secondary to syncope versus seizures.     * Spells of decreased attentiveness- (present on admission)  Assessment & Plan  Patient has had three episodes concerning for seizures versus autonomic dysfunction related to patient's Parkinson's disease.  This was associated with syncope, tongue biting, and bladder incontinence.    Admit to neurology for observation  Fall and aspiration cautions  EEG with video monitoring  MRI with and without contrast  Orthostatic blood pressures  Continuous telemetry monitoring  Outpatient referral follow-up for neurology    Parkinson's disease (HCC)- (present on admission)  Assessment & Plan  As per history.  Previously evaluated at [facility] by neurologist there.    Patient will need outpatient follow-up with neurology  I explained to the patient that due to our short staffing of neurologist in the hospital, they would unlikely be able to fully evaluate the  patient during her hospitalization.    Slow transit constipation- (present on admission)  Assessment & Plan  Patient reports having developed constipation following his previous surgery.    Continue home MiraLAX    Hyperlipidemia- (present on admission)  Assessment & Plan  As per history.    Continue home atorvastatin    DNR (do not resuscitate)- (present on admission)  Assessment & Plan  I discussed the CODE STATUS with the patient.  He wishes to be DNR/DNI.        VTE prophylaxis: SCDs/TEDs and enoxaparin ppx

## 2022-11-28 NOTE — ASSESSMENT & PLAN NOTE
Patient has had three episodes concerning for seizures versus autonomic dysfunction related to patient's Parkinson's disease.  This was associated with syncope, tongue biting, and bladder incontinence.    Admit to neurology for observation  Fall and aspiration cautions  EEG with video monitoring  MRI with and without contrast  Orthostatic blood pressures  Continuous telemetry monitoring  Outpatient referral follow-up for neurology

## 2022-11-28 NOTE — PROGRESS NOTES
Assumed care. A/O, laying in bed. No c/o of pain, tremors etc. States the small tremors in his right hand is his parkinson's baseline. UA noted to be dark in color. Encouraged fluids.

## 2022-11-28 NOTE — CARE PLAN
Problem: Knowledge Deficit - Standard  Goal: Patient and family/care givers will demonstrate understanding of plan of care, disease process/condition, diagnostic tests and medications  Outcome: Progressing     Problem: Fall Risk  Goal: Patient will remain free from falls  Outcome: Progressing     The patient is Stable - Low risk of patient condition declining or worsening         Progress made toward(s) clinical / shift goals:  Patient understands the importance of voicing his feelings and needs. Patient understands the treatment plan that has been set by his care team.     Patient is not progressing towards the following goals:

## 2023-02-15 PROBLEM — E55.9 VITAMIN D DEFICIENCY: Status: ACTIVE | Noted: 2023-02-15

## 2023-02-15 PROBLEM — Z12.83 SCREENING FOR MALIGNANT NEOPLASM OF SKIN: Status: ACTIVE | Noted: 2023-02-15

## 2023-02-15 PROBLEM — Z12.5 SCREENING FOR PROSTATE CANCER: Status: ACTIVE | Noted: 2023-02-15

## 2023-02-15 PROBLEM — E03.9 ACQUIRED HYPOTHYROIDISM: Status: ACTIVE | Noted: 2023-02-15

## 2023-02-15 PROBLEM — R73.03 PREDIABETES: Status: ACTIVE | Noted: 2023-02-15

## 2023-02-15 PROBLEM — R53.83 FATIGUE: Status: ACTIVE | Noted: 2023-02-15

## 2023-02-15 PROBLEM — Z76.89 ENCOUNTER TO ESTABLISH CARE WITH NEW DOCTOR: Status: ACTIVE | Noted: 2023-02-15

## 2023-09-21 PROBLEM — F51.01 PRIMARY INSOMNIA: Status: ACTIVE | Noted: 2023-09-21

## 2023-09-21 PROBLEM — Z12.12 SCREENING FOR COLORECTAL CANCER: Status: ACTIVE | Noted: 2023-09-21

## 2023-09-21 PROBLEM — Z12.11 SCREENING FOR COLORECTAL CANCER: Status: ACTIVE | Noted: 2023-09-21

## 2023-09-21 PROBLEM — M19.90 ARTHRITIS: Status: ACTIVE | Noted: 2023-09-21

## 2023-09-21 PROBLEM — Z00.00 MEDICARE ANNUAL WELLNESS VISIT, SUBSEQUENT: Status: ACTIVE | Noted: 2023-09-21

## 2024-03-21 PROBLEM — I77.810 DILATED AORTIC ROOT (HCC): Status: ACTIVE | Noted: 2024-03-21

## 2024-03-21 PROBLEM — Z12.83 SCREENING FOR MALIGNANT NEOPLASM OF SKIN: Status: RESOLVED | Noted: 2023-02-15 | Resolved: 2024-03-21

## 2024-03-21 PROBLEM — R53.83 FATIGUE: Status: RESOLVED | Noted: 2023-02-15 | Resolved: 2024-03-21

## 2024-03-21 PROBLEM — Z12.5 SCREENING FOR PROSTATE CANCER: Status: RESOLVED | Noted: 2023-02-15 | Resolved: 2024-03-21

## 2024-03-21 PROBLEM — Z00.00 MEDICARE ANNUAL WELLNESS VISIT, SUBSEQUENT: Status: RESOLVED | Noted: 2023-09-21 | Resolved: 2024-03-21

## 2024-03-21 PROBLEM — Z12.11 SCREENING FOR COLORECTAL CANCER: Status: RESOLVED | Noted: 2023-09-21 | Resolved: 2024-03-21

## 2024-03-21 PROBLEM — E03.9 ACQUIRED HYPOTHYROIDISM: Status: RESOLVED | Noted: 2023-02-15 | Resolved: 2024-03-21

## 2024-03-21 PROBLEM — Z12.12 SCREENING FOR COLORECTAL CANCER: Status: RESOLVED | Noted: 2023-09-21 | Resolved: 2024-03-21

## 2024-03-21 PROBLEM — I34.0 MILD MITRAL REGURGITATION: Status: ACTIVE | Noted: 2024-03-21

## 2024-03-21 PROBLEM — M19.90 ARTHRITIS: Status: RESOLVED | Noted: 2023-09-21 | Resolved: 2024-03-21

## 2024-03-21 PROBLEM — Z76.89 ENCOUNTER TO ESTABLISH CARE WITH NEW DOCTOR: Status: RESOLVED | Noted: 2023-02-15 | Resolved: 2024-03-21

## 2024-06-10 ENCOUNTER — NON-PROVIDER VISIT (OUTPATIENT)
Dept: CARDIOLOGY | Facility: MEDICAL CENTER | Age: 69
End: 2024-06-10
Payer: MEDICARE

## 2024-06-10 DIAGNOSIS — I49.3 FREQUENT PVCS: ICD-10-CM

## 2024-06-10 DIAGNOSIS — I47.20 VT (VENTRICULAR TACHYCARDIA) (HCC): ICD-10-CM

## 2024-06-10 DIAGNOSIS — I49.1 PREMATURE ATRIAL CONTRACTIONS: ICD-10-CM

## 2024-06-10 DIAGNOSIS — I47.10 SVT (SUPRAVENTRICULAR TACHYCARDIA) (HCC): ICD-10-CM

## 2024-06-28 ENCOUNTER — TELEPHONE (OUTPATIENT)
Dept: CARDIOLOGY | Facility: MEDICAL CENTER | Age: 69
End: 2024-06-28
Payer: MEDICARE

## 2024-06-28 NOTE — TELEPHONE ENCOUNTER
Urgent ZIO EOS to AB's nurse, Cinthya, on 6/28/2024    Ventricular Tachycardia    Monitor analysis time: 11 days 16 hours    Preliminary findings:    2 episodes of -190 with an avg rate of 158 bpm    31 episodes of SVT  with an avg rate of 131 bpm    Sinus Rhythm  with an avg rate of 64 bpm    Supraventricular ectopy: 1.6% isolated, rare couplets and triplets    Ventricular ectopy: 8.1% isolated, rare couplets and triplets    No patient events

## 2024-07-15 DIAGNOSIS — Z01.812 PRE-PROCEDURE LAB EXAM: ICD-10-CM

## 2024-07-25 ENCOUNTER — TELEPHONE (OUTPATIENT)
Dept: CARDIOLOGY | Facility: MEDICAL CENTER | Age: 69
End: 2024-07-25
Payer: MEDICARE

## 2024-08-14 NOTE — PROGRESS NOTES
Caseyo placed in [place]  End of service report scanned to Marii Suazo on 6/28/24.  Request sent to ADD, [place], on 8/14/24

## 2024-08-15 PROCEDURE — 93248 EXT ECG>7D<15D REV&INTERPJ: CPT | Performed by: INTERNAL MEDICINE

## 2024-09-04 NOTE — EEG PROGRESS NOTE
EEG ordered ok per  to be done OutPt.   Regarding: Still not well  ----- Message from Karishma ESTEVES sent at 9/4/2024  9:40 AM EDT -----  I am still not feeling well, I tire easily and weak and a lot of    all over  body pain. I still have a lot of mucus at times that is thick and green, and now I have started with diarrhea.

## 2024-11-19 PROBLEM — R73.03 PREDIABETES: Status: RESOLVED | Noted: 2023-02-15 | Resolved: 2024-11-19

## 2025-05-23 PROBLEM — I35.1 AORTIC VALVE REGURGITATION: Status: ACTIVE | Noted: 2025-05-23

## 2025-05-23 PROBLEM — I07.1 TRACE TRICUSPID REGURGITATION BY PRIOR ECHOCARDIOGRAM: Status: ACTIVE | Noted: 2025-05-23

## 2025-05-23 PROBLEM — R93.1 DECREASED CARDIAC EJECTION FRACTION: Status: ACTIVE | Noted: 2025-05-23

## 2025-05-28 ENCOUNTER — OFFICE VISIT (OUTPATIENT)
Dept: CARDIOLOGY | Facility: PHYSICIAN GROUP | Age: 70
End: 2025-05-28
Payer: MEDICARE

## 2025-05-28 VITALS
WEIGHT: 233.47 LBS | RESPIRATION RATE: 14 BRPM | HEART RATE: 74 BPM | HEIGHT: 73 IN | OXYGEN SATURATION: 96 % | BODY MASS INDEX: 30.94 KG/M2 | SYSTOLIC BLOOD PRESSURE: 126 MMHG | DIASTOLIC BLOOD PRESSURE: 78 MMHG

## 2025-05-28 DIAGNOSIS — G47.33 OBSTRUCTIVE SLEEP APNEA SYNDROME: ICD-10-CM

## 2025-05-28 DIAGNOSIS — E78.00 PURE HYPERCHOLESTEROLEMIA: ICD-10-CM

## 2025-05-28 DIAGNOSIS — G20.A1 PARKINSON'S DISEASE, UNSPECIFIED WHETHER DYSKINESIA PRESENT, UNSPECIFIED WHETHER MANIFESTATIONS FLUCTUATE (HCC): ICD-10-CM

## 2025-05-28 DIAGNOSIS — R06.02 SHORTNESS OF BREATH: Primary | ICD-10-CM

## 2025-05-28 PROCEDURE — 3074F SYST BP LT 130 MM HG: CPT | Performed by: NURSE PRACTITIONER

## 2025-05-28 PROCEDURE — 99214 OFFICE O/P EST MOD 30 MIN: CPT | Performed by: NURSE PRACTITIONER

## 2025-05-28 PROCEDURE — 3078F DIAST BP <80 MM HG: CPT | Performed by: NURSE PRACTITIONER

## 2025-05-28 RX ORDER — NITROGLYCERIN 0.4 MG/1
0.4 TABLET SUBLINGUAL PRN
Qty: 25 TABLET | Refills: 3 | Status: SHIPPED | OUTPATIENT
Start: 2025-05-28

## 2025-05-28 ASSESSMENT — ENCOUNTER SYMPTOMS
LOSS OF CONSCIOUSNESS: 0
CHILLS: 0
MYALGIAS: 0
SHORTNESS OF BREATH: 1
PND: 0
FEVER: 0
PALPITATIONS: 0
HEADACHES: 0
DIZZINESS: 0
BRUISES/BLEEDS EASILY: 0
ORTHOPNEA: 0
NAUSEA: 0
COUGH: 0
ABDOMINAL PAIN: 0
INSOMNIA: 0

## 2025-05-28 ASSESSMENT — FIBROSIS 4 INDEX: FIB4 SCORE: 1.61

## 2025-05-28 NOTE — PROGRESS NOTES
Chief Complaint   Patient presents with    Follow-Up    Shortness of Breath    Hyperlipidemia       Subjective     Erasto Aguilar is a 70 y.o. male who presents today for annual follow-up of elevated CAC score and hyperlipidemia.    Erasto is a 70 year old male with history of Parkinson's disease (diagnosed in 2019 at [facility] in [place], followed by [facility] in [place]), hyperlipidemia, elevated CTCS score and SAMMY, last seen by me in [place] in May 2024.     In November 2022, he was hospitalized for syncopal episode during PT, after hamstring repair (in August 2022). Echocardiogram in 2022 showed aortic root 4.2cm and ascending aorta 3.9cm. His symptoms were attributed to Parkinson's. Recent repeat echocardiogram in May 2025 showed normal aortic root and ascending aorta stable at 3.6cm.     In March 2024, he had a CAC score of 678, and he was restarted on his statin; he had previously stopped this due to side effects.    In May 2024, we discussed doing a CTA angiogram to assess for an coronary stenosis, but he could not lie still due to Parkinson's.    In April 2025, his PCP ordered an echo and renal US, both of which came back normal/stable.  He does have some mild LVH, LVEF 52% and mild MR/AR.    He is here today for annual follow-up. He does note some shortness of breath with exertion, and it seems to take longer to recover with exercise. No overt chest pain, pressure, or discomfort, but occasionally some tightness. No orthopnea or PND; he is able get get his HR up with exercise. No dizziness or syncope; some balance problems with his Parkinson's. No falls. No LE edema.      He is here today to establish care with cardiology. He does note some occasional shortness of breath with exertion, with some chest tightness. No overt pain, pressure or discomfort; no palpitations; no orthopnea or PND. Some lightheadedness and balance issues due to Parkinson's, but no falls or syncope; no LE edema. BP remains  stable.       Past Medical History[1]  Past Surgical History[2]  Family History   Problem Relation Age of Onset    Parkinson's Disease Mother     Alzheimer's Disease Father      Social History     Socioeconomic History    Marital status:      Spouse name: Not on file    Number of children: Not on file    Years of education: Not on file    Highest education level: Not on file   Occupational History    Not on file   Tobacco Use    Smoking status: Never    Smokeless tobacco: Never   Vaping Use    Vaping status: Never Used   Substance and Sexual Activity    Alcohol use: Not Currently    Drug use: No    Sexual activity: Not on file   Other Topics Concern    Not on file   Social History Narrative    Not on file     Social Drivers of Health     Financial Resource Strain: Low Risk  (2/15/2023)    Overall Financial Resource Strain (CARDIA)     Difficulty of Paying Living Expenses: Not hard at all   Food Insecurity: No Food Insecurity (2/15/2023)    Hunger Vital Sign     Worried About Running Out of Food in the Last Year: Never true     Ran Out of Food in the Last Year: Never true   Transportation Needs: No Transportation Needs (2/15/2023)    PRAPARE - Transportation     Lack of Transportation (Medical): No     Lack of Transportation (Non-Medical): No   Physical Activity: Sufficiently Active (2/15/2023)    Exercise Vital Sign     Days of Exercise per Week: 7 days     Minutes of Exercise per Session: 60 min   Stress: No Stress Concern Present (2/15/2023)    [employer] [place] [employer] - Occupational Stress Questionnaire     Feeling of Stress : Not at all   Social Connections: Socially Integrated (2/15/2023)    Social Connection and Isolation Panel [NHANES]     Frequency of Communication with Friends and Family: More than three times a week     Frequency of Social Gatherings with Friends and Family: Once a week     Attends Buddhist Services: 1 to 4 times per year     Active Member of Clubs or Organizations:  "Yes     Attends Club or Organization Meetings: More than 4 times per year     Marital Status:    Intimate Partner Violence: Low Risk  (6/26/2023)    Received from [facility]    History of Abuse     History of Abuse: Denies   Housing Stability: Low Risk  (2/15/2023)    Housing Stability Vital Sign     Unable to Pay for Housing in the Last Year: No     Number of Places Lived in the Last Year: 1     Unstable Housing in the Last Year: No     Allergies[3]  Encounter Medications[4]  Review of Systems   Constitutional:  Negative for chills and fever.   HENT:  Negative for congestion.    Respiratory:  Positive for shortness of breath. Negative for cough.    Cardiovascular:  Negative for chest pain, palpitations, orthopnea, leg swelling and PND.   Gastrointestinal:  Negative for abdominal pain and nausea.   Musculoskeletal:  Negative for myalgias.   Skin:  Negative for rash.   Neurological:  Negative for dizziness, loss of consciousness and headaches.        Balance issues due to Parkinson's, no falls.   Endo/Heme/Allergies:  Does not bruise/bleed easily.   Psychiatric/Behavioral:  The patient does not have insomnia.               Objective     /78 (BP Location: Left arm, Patient Position: Sitting, BP Cuff Size: Adult)   Pulse 74   Resp 14   Ht 1.854 m (6' 1\")   Wt 106 kg (233 lb 7.5 oz)   SpO2 96%   BMI 30.80 kg/m²     Physical Exam  Constitutional:       Appearance: He is well-developed.   HENT:      Head: Normocephalic.   Neck:      Vascular: No JVD.   Cardiovascular:      Rate and Rhythm: Normal rate and regular rhythm.      Heart sounds: Normal heart sounds.   Pulmonary:      Effort: Pulmonary effort is normal. No respiratory distress.      Breath sounds: Normal breath sounds. No wheezing or rales.   Abdominal:      General: Bowel sounds are normal. There is no distension.      Palpations: Abdomen is soft.      Tenderness: There is no abdominal tenderness.   Musculoskeletal:         " General: Normal range of motion.      Cervical back: Normal range of motion and neck supple.      Comments: Slight tremor of hands bilaterally.   Skin:     General: Skin is warm and dry.      Findings: No rash.   Neurological:      Mental Status: He is alert and oriented to person, place, and time.   Psychiatric:         Mood and Affect: Mood normal.         Behavior: Behavior normal.       CT SCAN(S):    FINDINGS OF CTCS OF 3/29/2024:  VESSEL LEVEL SCORING:  LM: 0  LAD:179  LCx: 115  RCA: 366  Diagonal: 18  The total Agatston CAC score is 678.    ECHOCARDIOGRAPHY:    CONCLUSIONS OF TTE OF 5/23/2025:  1. The left ventricular ejection fraction is visually estimated to be   52%.  2. Mild aortic insufficiency.   3. Estimated right ventricular systolic pressure is 20 mmHg.    CONCLUSIONS OF TTE OF 3/27/2024:  Normal left ventricular size and systolic function.  The right ventricle is normal in size and systolic function.  The left atrium is normal in size.  No pericardial effusion.     CONCLUSIONS OF TTE OF 11/28/2022:  No prior study is available for comparison.   The left ventricular ejection fraction is visually estimated to be 65%.  Unable to estimate right ventricular systolic pressure due to an   inadequate tricuspid regurgitant jet.     SONOGRAPHY:    IMPRESSION OF RENAL US OF 5/23/2025:  Normal renal arterial study without evidence for renal artery stenosis.    CONCLUSIONS OF LE US OF 11/27/2022:  No acute thrombosis is identified.      LABS:    Lab Results   Component Value Date/Time    CHOLSTRLTOT 193 04/29/2025 09:26 AM     (H) 04/29/2025 09:26 AM    HDL 51.0 04/29/2025 09:26 AM    TRIGLYCERIDE 78 04/29/2025 09:26 AM        Lab Results   Component Value Date/Time    ASTSGOT 16 04/29/2025 09:26 AM    ALTSGPT 10 (L) 04/29/2025 09:26 AM       Lab Results   Component Value Date/Time    SODIUM 141 04/29/2025 09:26 AM    POTASSIUM 4.4 04/29/2025 09:26 AM    CHLORIDE 106 04/29/2025 09:26 AM    CO2 27  04/29/2025 09:26 AM    GLUCOSE 114 (H) 04/29/2025 09:26 AM    BUN 19 (H) 04/29/2025 09:26 AM    CREATININE 1.4 (H) 04/29/2025 09:26 AM        Assessment & Plan     1. Shortness of breath  NM-CARDIAC STRESS TEST      2. Pure hypercholesterolemia        3. Obstructive sleep apnea syndrome        4. Parkinson's disease, unspecified whether dyskinesia present, unspecified whether manifestations fluctuate ([facility])            Medical Decision Making: Today's Assessment/Status/Plan:        Shortness of breath with exertion, possible anginal equivalent.  We will obtain stress test, as he was unable to lie still for CTA angiogram. If abnormal, will proceed with [facility], which we discussed at length at today's visit. We discussed risk factor modification, including keeping BP well controlled, and LDL <70.  Hyperlipidemia with CAC score >400, now on Crestor 20mg. To repeat lipid panel to assess effectiveness. LDL goal is <70.  SAMMY, treated/stable.  Parkinson's disease, followed by neurology in [place].    As above, will proceed with stress test.  He is given NTG 0.4mg SL to use PRN.  Same medications for now.  Repeat lipid panel to reassess LDL.    Follow-up TBD based on results of stress test.                     [1]   Past Medical History:  Diagnosis Date    Bursitis     Dilated aortic root (HCC) 03/2024    Echocardiogram with ascending aorta 3.6cm. Aortic root 3.7cm.    Hyperlipidemia 03/2024    CTCS score of 678.    Muscle disorder     Parkinson's Disease / Patient not on medication currently    SAMMY (obstructive sleep apnea)     Parkinson disease ([facility])     Prediabetes 02/15/2023   [2]   Past Surgical History:  Procedure Laterality Date    HAMSTRING TENDON REPAIR Left 8/25/2022    Procedure: REPAIR OF LEFT KNEE QUADRICEPS TENDON RUPTURE;  Surgeon: Eitan Jay M.D.;  Location: SURGERY [facility];  Service: Orthopedics    CHOLECYSTECTOMY     [3]   Allergies  Allergen Reactions    Pcn [Penicillins] Anaphylaxis     Had  throat swelling with penicillin around 30 years ago.   [4]   Outpatient Encounter Medications as of 5/28/2025   Medication Sig Dispense Refill    nitroglycerin (NITROSTAT) 0.4 MG SL Tab Place 1 Tablet under the tongue as needed for Chest Pain. 25 Tablet 3    rosuvastatin (CRESTOR) 20 MG Tab Take 1 Tablet by mouth every evening. 100 Tablet 1    ibuprofen (MOTRIN) 800 MG Tab Take 800 mg by mouth every 8 hours as needed.      ondansetron (ZOFRAN ODT) 4 MG TABLET DISPERSIBLE Take 1 Tablet by mouth every 6 hours as needed for Nausea/Vomiting. 10 Tablet 0    hydrOXYzine HCl (ATARAX) 25 MG Tab Take 1 Tablet by mouth at bedtime as needed for Itching or Anxiety. 90 Tablet 3    Carbidopa-Levodopa ER (RYTARY) 23.75-95 MG Cap CR Take 1 Capsule by mouth 4 times a day.       No facility-administered encounter medications on file as of 5/28/2025.

## 2025-06-06 ENCOUNTER — APPOINTMENT (OUTPATIENT)
Dept: RADIOLOGY | Facility: MEDICAL CENTER | Age: 70
End: 2025-06-06
Attending: NURSE PRACTITIONER
Payer: MEDICARE

## 2025-07-10 ENCOUNTER — RESULTS FOLLOW-UP (OUTPATIENT)
Dept: CARDIOLOGY | Facility: MEDICAL CENTER | Age: 70
End: 2025-07-10
Payer: MEDICARE

## 2025-07-10 ENCOUNTER — PATIENT MESSAGE (OUTPATIENT)
Dept: CARDIOLOGY | Facility: PHYSICIAN GROUP | Age: 70
End: 2025-07-10
Payer: MEDICARE

## 2025-07-11 ENCOUNTER — TELEPHONE (OUTPATIENT)
Dept: CARDIOLOGY | Facility: MEDICAL CENTER | Age: 70
End: 2025-07-11
Payer: MEDICARE

## 2025-07-11 ENCOUNTER — APPOINTMENT (OUTPATIENT)
Dept: ADMISSIONS | Facility: MEDICAL CENTER | Age: 70
End: 2025-07-11
Attending: INTERNAL MEDICINE
Payer: MEDICARE

## 2025-07-11 DIAGNOSIS — R93.1 DECREASED CARDIAC EJECTION FRACTION: Primary | ICD-10-CM

## 2025-07-11 DIAGNOSIS — R06.02 SHORTNESS OF BREATH: ICD-10-CM

## 2025-07-11 DIAGNOSIS — R07.89 OTHER CHEST PAIN: ICD-10-CM

## 2025-07-11 DIAGNOSIS — R94.39 ABNORMAL CARDIOVASCULAR STRESS TEST: ICD-10-CM

## 2025-07-11 RX ORDER — NITROGLYCERIN 0.4 MG/1
0.4 TABLET SUBLINGUAL PRN
Qty: 25 TABLET | Refills: 3 | Status: SHIPPED | OUTPATIENT
Start: 2025-07-11

## 2025-07-11 NOTE — TELEPHONE ENCOUNTER
Patient is scheduled on 7-14-25 for a LHC w/poss with Dr.Jad De Jesus at [facility]. No meds to stop and patient to check in at 9:30 for an 11:00 procedure. Updated H&P to be done on admit by . Patient was sent instruction sheet via [employer] per his request.

## 2025-07-11 NOTE — TELEPHONE ENCOUNTER
----- Message from Nurse Practitioner SOLE Espinoza sent at 7/11/2025 11:28 AM PDT -----  Regarding: [facility] please  Please call this patient to set up [facility], the sooner the better please.  Order is in [facility], and he knows to expect your call.    Thank you!  AB

## 2025-07-11 NOTE — PROGRESS NOTES
I put in order for [facility], and sent him another [employer] message.  Also sent Rx for NTG to use PRN.  Thanks, AB

## 2025-07-11 NOTE — OR NURSING
PAT appointment completed, health history and medications updated, emailed map and verified NPO instructions

## 2025-07-11 NOTE — PATIENT COMMUNICATION
Noted AB response/recommendations. See below.   SOLE Espinoza  7/11/25 11:30 AM  Note      I put in order for [facility], and sent him another [employer] message.  Also sent Rx for NTG to use PRN.  Thanks, AB

## 2025-07-11 NOTE — PATIENT COMMUNICATION
Results mgmt information 7/10/25  ==================================================  Noted AB response/MyChart message to pt, below  SOLE Espinoza to Me (Selected Message)   7/10/25  1:19 PM  Result Note  MPI suggestive of ischemia (abnormal stress test).  I sent him a MyChart message with this same information.  Next step would be an angiogram.   Can you please call him to see if he agrees to proceed?  If yes, then I can order and send to scheduling.  Thanks, AB  NM-HEART MUSCLE IMAGE,SPECT MULT    ============================================  Noted pt MyChart response.  =================================  To AB pt responded to your MyChart message before I was able to contact him. Please advise with any recommendations/concerns. Thank you.

## 2025-07-14 ENCOUNTER — HOSPITAL ENCOUNTER (OUTPATIENT)
Facility: MEDICAL CENTER | Age: 70
End: 2025-07-14
Attending: INTERNAL MEDICINE | Admitting: INTERNAL MEDICINE
Payer: MEDICARE

## 2025-07-14 ENCOUNTER — APPOINTMENT (OUTPATIENT)
Facility: MEDICAL CENTER | Age: 70
End: 2025-07-14
Attending: NURSE PRACTITIONER
Payer: MEDICARE

## 2025-07-14 VITALS
DIASTOLIC BLOOD PRESSURE: 66 MMHG | TEMPERATURE: 97.5 F | WEIGHT: 233.69 LBS | BODY MASS INDEX: 30.97 KG/M2 | HEIGHT: 73 IN | HEART RATE: 44 BPM | RESPIRATION RATE: 18 BRPM | OXYGEN SATURATION: 99 % | SYSTOLIC BLOOD PRESSURE: 134 MMHG

## 2025-07-14 DIAGNOSIS — R94.39 ABNORMAL CARDIOVASCULAR STRESS TEST: ICD-10-CM

## 2025-07-14 DIAGNOSIS — R07.89 OTHER CHEST PAIN: ICD-10-CM

## 2025-07-14 DIAGNOSIS — Z95.5 STATUS POST INSERTION OF DRUG ELUTING CORONARY ARTERY STENT: ICD-10-CM

## 2025-07-14 DIAGNOSIS — R93.1 DECREASED CARDIAC EJECTION FRACTION: ICD-10-CM

## 2025-07-14 DIAGNOSIS — I20.89 STABLE ANGINA (HCC): Primary | ICD-10-CM

## 2025-07-14 DIAGNOSIS — R06.02 SHORTNESS OF BREATH: ICD-10-CM

## 2025-07-14 LAB
ACT BLD: 233 S (ref 74–137)
ACT BLD: 256 S (ref 74–137)
ACT BLD: 279 S (ref 74–137)
ACT BLD: 314 S (ref 74–137)
ALBUMIN SERPL BCP-MCNC: 4.6 G/DL (ref 3.2–4.9)
ALBUMIN/GLOB SERPL: 1.6 G/DL
ALP SERPL-CCNC: 55 U/L (ref 30–99)
ALT SERPL-CCNC: <5 U/L (ref 2–50)
ANION GAP SERPL CALC-SCNC: 14 MMOL/L (ref 7–16)
APTT PPP: 20.9 SEC (ref 24.7–36)
AST SERPL-CCNC: 20 U/L (ref 12–45)
BILIRUB SERPL-MCNC: 0.6 MG/DL (ref 0.1–1.5)
BUN SERPL-MCNC: 22 MG/DL (ref 8–22)
CALCIUM ALBUM COR SERPL-MCNC: 8.6 MG/DL (ref 8.5–10.5)
CALCIUM SERPL-MCNC: 9.1 MG/DL (ref 8.5–10.5)
CHLORIDE SERPL-SCNC: 104 MMOL/L (ref 96–112)
CO2 SERPL-SCNC: 20 MMOL/L (ref 20–33)
CREAT SERPL-MCNC: 1.29 MG/DL (ref 0.5–1.4)
ERYTHROCYTE [DISTWIDTH] IN BLOOD BY AUTOMATED COUNT: 37.6 FL (ref 35.9–50)
GFR SERPLBLD CREATININE-BSD FMLA CKD-EPI: 59 ML/MIN/1.73 M 2
GLOBULIN SER CALC-MCNC: 2.8 G/DL (ref 1.9–3.5)
GLUCOSE SERPL-MCNC: 104 MG/DL (ref 65–99)
HCT VFR BLD AUTO: 44 % (ref 42–52)
HGB BLD-MCNC: 15 G/DL (ref 14–18)
INR PPP: 0.99 (ref 0.87–1.13)
MCH RBC QN AUTO: 28.8 PG (ref 27–33)
MCHC RBC AUTO-ENTMCNC: 34.1 G/DL (ref 32.3–36.5)
MCV RBC AUTO: 84.6 FL (ref 81.4–97.8)
PLATELET # BLD AUTO: 165 K/UL (ref 164–446)
PMV BLD AUTO: 9.5 FL (ref 9–12.9)
POTASSIUM SERPL-SCNC: 4.6 MMOL/L (ref 3.6–5.5)
PROT SERPL-MCNC: 7.4 G/DL (ref 6–8.2)
PROTHROMBIN TIME: 13.4 SEC (ref 12–14.6)
RBC # BLD AUTO: 5.2 M/UL (ref 4.7–6.1)
SODIUM SERPL-SCNC: 138 MMOL/L (ref 135–145)
WBC # BLD AUTO: 5.6 K/UL (ref 4.8–10.8)

## 2025-07-14 PROCEDURE — 92978 ENDOLUMINL IVUS OCT C 1ST: CPT | Mod: 26,LD | Performed by: INTERNAL MEDICINE

## 2025-07-14 PROCEDURE — 85027 COMPLETE CBC AUTOMATED: CPT

## 2025-07-14 PROCEDURE — 700117 HCHG RX CONTRAST REV CODE 255: Performed by: INTERNAL MEDICINE

## 2025-07-14 PROCEDURE — 700102 HCHG RX REV CODE 250 W/ 637 OVERRIDE(OP)

## 2025-07-14 PROCEDURE — 93458 L HRT ARTERY/VENTRICLE ANGIO: CPT | Mod: 26,59 | Performed by: INTERNAL MEDICINE

## 2025-07-14 PROCEDURE — 80053 COMPREHEN METABOLIC PANEL: CPT

## 2025-07-14 PROCEDURE — 36415 COLL VENOUS BLD VENIPUNCTURE: CPT

## 2025-07-14 PROCEDURE — 160193 HCHG PACU STANDARD - 1ST 60 MINS

## 2025-07-14 PROCEDURE — 92928 PRQ TCAT PLMT NTRAC ST 1 LES: CPT | Mod: 22,LD | Performed by: INTERNAL MEDICINE

## 2025-07-14 PROCEDURE — 99152 MOD SED SAME PHYS/QHP 5/>YRS: CPT | Performed by: INTERNAL MEDICINE

## 2025-07-14 PROCEDURE — 92929 PR PRQ TRLUML CORONARY STENT W/ANGIO ADDL ART/BRNCH: CPT | Mod: 22,LD | Performed by: INTERNAL MEDICINE

## 2025-07-14 PROCEDURE — 85347 COAGULATION TIME ACTIVATED: CPT | Performed by: INTERNAL MEDICINE

## 2025-07-14 PROCEDURE — 700111 HCHG RX REV CODE 636 W/ 250 OVERRIDE (IP): Mod: JZ

## 2025-07-14 PROCEDURE — 700101 HCHG RX REV CODE 250

## 2025-07-14 PROCEDURE — 99153 MOD SED SAME PHYS/QHP EA: CPT

## 2025-07-14 PROCEDURE — 160046 HCHG PACU - 1ST 60 MINS PHASE II

## 2025-07-14 PROCEDURE — 85610 PROTHROMBIN TIME: CPT

## 2025-07-14 PROCEDURE — 160015 HCHG STAT PREOP MINUTES

## 2025-07-14 PROCEDURE — 85730 THROMBOPLASTIN TIME PARTIAL: CPT

## 2025-07-14 PROCEDURE — A9270 NON-COVERED ITEM OR SERVICE: HCPCS

## 2025-07-14 PROCEDURE — 99221 1ST HOSP IP/OBS SF/LOW 40: CPT | Mod: 25,FS | Performed by: INTERNAL MEDICINE

## 2025-07-14 PROCEDURE — 160047 HCHG PACU  - EA ADDL 30 MINS PHASE II

## 2025-07-14 PROCEDURE — 160002 HCHG RECOVERY MINUTES (STAT)

## 2025-07-14 PROCEDURE — 92921 PR PRQ TRLUML CORONARY ANGIOPLASTY ADDL BRANCH: CPT | Mod: LD | Performed by: INTERNAL MEDICINE

## 2025-07-14 RX ORDER — ONDANSETRON 2 MG/ML
INJECTION INTRAMUSCULAR; INTRAVENOUS
Status: COMPLETED
Start: 2025-07-14 | End: 2025-07-14

## 2025-07-14 RX ORDER — CLOPIDOGREL BISULFATE 75 MG/1
75 TABLET ORAL DAILY
Qty: 90 TABLET | Refills: 3 | Status: SHIPPED | OUTPATIENT
Start: 2025-07-14

## 2025-07-14 RX ORDER — MIDAZOLAM HYDROCHLORIDE 1 MG/ML
INJECTION INTRAMUSCULAR; INTRAVENOUS
Status: COMPLETED
Start: 2025-07-14 | End: 2025-07-14

## 2025-07-14 RX ORDER — HEPARIN SODIUM 1000 [USP'U]/ML
INJECTION, SOLUTION INTRAVENOUS; SUBCUTANEOUS
Status: COMPLETED
Start: 2025-07-14 | End: 2025-07-14

## 2025-07-14 RX ORDER — ASPIRIN 81 MG/1
81 TABLET ORAL DAILY
Status: DISCONTINUED | OUTPATIENT
Start: 2025-07-15 | End: 2025-07-14 | Stop reason: HOSPADM

## 2025-07-14 RX ORDER — CLOPIDOGREL 300 MG/1
TABLET, FILM COATED ORAL
Status: COMPLETED
Start: 2025-07-14 | End: 2025-07-14

## 2025-07-14 RX ORDER — HEPARIN SODIUM 200 [USP'U]/100ML
INJECTION, SOLUTION INTRAVENOUS
Status: COMPLETED
Start: 2025-07-14 | End: 2025-07-14

## 2025-07-14 RX ORDER — SODIUM CHLORIDE 9 MG/ML
1.5 INJECTION, SOLUTION INTRAVENOUS CONTINUOUS
Status: DISCONTINUED | OUTPATIENT
Start: 2025-07-14 | End: 2025-07-14 | Stop reason: HOSPADM

## 2025-07-14 RX ORDER — VERAPAMIL HYDROCHLORIDE 2.5 MG/ML
INJECTION INTRAVENOUS
Status: COMPLETED
Start: 2025-07-14 | End: 2025-07-14

## 2025-07-14 RX ORDER — PHENYLEPHRINE HCL IN 0.9% NACL 1 MG/10 ML
SYRINGE (ML) INTRAVENOUS
Status: COMPLETED
Start: 2025-07-14 | End: 2025-07-14

## 2025-07-14 RX ORDER — CLOPIDOGREL BISULFATE 75 MG/1
75 TABLET ORAL DAILY
Status: DISCONTINUED | OUTPATIENT
Start: 2025-07-15 | End: 2025-07-14 | Stop reason: HOSPADM

## 2025-07-14 RX ORDER — CLOPIDOGREL 300 MG/1
600 TABLET, FILM COATED ORAL ONCE
Status: DISCONTINUED | OUTPATIENT
Start: 2025-07-14 | End: 2025-07-14

## 2025-07-14 RX ORDER — ASPIRIN 81 MG/1
81 TABLET, CHEWABLE ORAL DAILY
Qty: 100 TABLET | Refills: 3 | Status: SHIPPED | OUTPATIENT
Start: 2025-07-14

## 2025-07-14 RX ORDER — ASPIRIN 81 MG/1
324 TABLET, CHEWABLE ORAL ONCE
Status: COMPLETED | OUTPATIENT
Start: 2025-07-14 | End: 2025-07-14

## 2025-07-14 RX ORDER — ASPIRIN 81 MG/1
TABLET, CHEWABLE ORAL
Status: COMPLETED
Start: 2025-07-14 | End: 2025-07-14

## 2025-07-14 RX ORDER — LIDOCAINE HYDROCHLORIDE 20 MG/ML
INJECTION, SOLUTION INFILTRATION; PERINEURAL
Status: COMPLETED
Start: 2025-07-14 | End: 2025-07-14

## 2025-07-14 RX ADMIN — LIDOCAINE HYDROCHLORIDE: 20 INJECTION, SOLUTION INFILTRATION; PERINEURAL at 11:53

## 2025-07-14 RX ADMIN — MIDAZOLAM HYDROCHLORIDE 1 MG: 1 INJECTION, SOLUTION INTRAMUSCULAR; INTRAVENOUS at 13:27

## 2025-07-14 RX ADMIN — ONDANSETRON 4 MG: 2 INJECTION INTRAMUSCULAR; INTRAVENOUS at 11:54

## 2025-07-14 RX ADMIN — IOHEXOL 125 ML: 350 INJECTION, SOLUTION INTRAVENOUS at 13:21

## 2025-07-14 RX ADMIN — MIDAZOLAM HYDROCHLORIDE 2 MG: 1 INJECTION, SOLUTION INTRAMUSCULAR; INTRAVENOUS at 12:40

## 2025-07-14 RX ADMIN — HEPARIN SODIUM: 1000 INJECTION, SOLUTION INTRAVENOUS; SUBCUTANEOUS at 11:53

## 2025-07-14 RX ADMIN — FENTANYL CITRATE 100 MCG: 50 INJECTION, SOLUTION INTRAMUSCULAR; INTRAVENOUS at 11:54

## 2025-07-14 RX ADMIN — ASPIRIN 324 MG: 81 TABLET, CHEWABLE ORAL at 11:23

## 2025-07-14 RX ADMIN — VERAPAMIL HYDROCHLORIDE 5 MG: 2.5 INJECTION, SOLUTION INTRAVENOUS at 11:54

## 2025-07-14 RX ADMIN — HEPARIN SODIUM: 1000 INJECTION, SOLUTION INTRAVENOUS; SUBCUTANEOUS at 12:40

## 2025-07-14 RX ADMIN — FENTANYL CITRATE 50 MCG: 50 INJECTION, SOLUTION INTRAMUSCULAR; INTRAVENOUS at 13:27

## 2025-07-14 RX ADMIN — HEPARIN SODIUM 2000 UNITS: 200 INJECTION, SOLUTION INTRAVENOUS at 11:55

## 2025-07-14 RX ADMIN — CLOPIDOGREL BISULFATE 600 MG: 300 TABLET, FILM COATED ORAL at 13:27

## 2025-07-14 RX ADMIN — MIDAZOLAM HYDROCHLORIDE 2 MG: 1 INJECTION, SOLUTION INTRAMUSCULAR; INTRAVENOUS at 11:54

## 2025-07-14 RX ADMIN — NITROGLYCERIN 10 ML: 20 INJECTION INTRAVENOUS at 11:53

## 2025-07-14 ASSESSMENT — ENCOUNTER SYMPTOMS
SORE THROAT: 0
MYALGIAS: 0
HEARTBURN: 0
CHILLS: 0
PND: 0
WEAKNESS: 0
SENSORY CHANGE: 0
ABDOMINAL PAIN: 0
HEADACHES: 0
FEVER: 0
VOMITING: 0
SEIZURES: 0
TREMORS: 1
EYE PAIN: 0
NECK PAIN: 1
COUGH: 0
ORTHOPNEA: 0
BLURRED VISION: 0
WHEEZING: 0
CONSTIPATION: 0
CLAUDICATION: 0
DOUBLE VISION: 0
DIZZINESS: 0
SHORTNESS OF BREATH: 0
BLOOD IN STOOL: 0
FOCAL WEAKNESS: 0
DIARRHEA: 0
PALPITATIONS: 0

## 2025-07-14 ASSESSMENT — FIBROSIS 4 INDEX: FIB4 SCORE: 1.75

## 2025-07-14 NOTE — PROCEDURES
Cardiac Catheterization Procedure    DATE: 7/14/2025    : Angel De Jesus MD, MPH    PROCEDURES PERFORMED:  Left heart catheterization  Coronary angiography  Complex percutaneous coronary intervention of proximal-mid LAD (bifurcation stenting)  Complex percutaneous coronary intervention of ostial-poximal First Diagonal branch (bifurcation stenting)  PTCA of ostial second diagonal branch  IVUS of the LAD  Ultrasound-guided right radial arterial access  Moderate conscious sedation    INDICATIONS:  Stable angina, abnormal cardiac stress test    CONSENT:  The complete alternatives, risks, and benefits of the procedure were explained to the patient. Informed consent was obtained prior to the procedure.    MEDICATIONS:  Lidocaine  Fentanyl  Midazolam  Nitroglycerin  Verapamil  Heparin  Aspirin  Plavix    MODERATE CONSCIOUS SEDATION:  I personally supervised the administration of moderate conscious sedation by the nursing staff for 103 minutes.  Start time: 11:41 AM  End time: 1320 4 PM    CONTRAST: Omnipaque 125 cc    RADIATION (Air Kerma): 675 mGy    FLUOROSCOPY TIME: 27.7 minutes    ESTIMATED BLOOD LOSS: < 50 cc    COMPLICATIONS: None apparent    PROCEDURE OVERVIEW:  The patient was brought to the cardiac catheterization laboratory in the fasting state. The skin over the right wrist was prepped and draped in the usual sterile fashion. Lidocaine infiltration was used to anesthetize the tissue over the right radial artery. Using the micropuncture technique, a 6-Kosovan Glidesheath was inserted in the right radial artery. A 5 Kosovan Fosburyer diagnostic catheter was then advanced over a standard J-wire into the left ventricular cavity where it was gently aspirated, flushed, and then withdrawn across the aortic valve with sequential pressures measured. This catheter was then used to engage the ostium of the left coronary artery and cineangiograms were obtained in multiple projections for complete evaluation of the  left coronary system. This catheter was then used to engage the ostium of the right coronary artery and cineangiograms were obtained in multiple projections for complete evaluation of the right coronary system. Following completion of coronary angiography, we proceeded with complex PCI of the LAD/diagonal branches. See below for more details.     HEMODYNAMICS:  Aortic pressure: 88/61 mmHg after sedation  LVEDP: 20 mmHg  No significant aortic gradient on pullback      CORONARY ANGIOGRAPHY:  The left main coronary artery: Normal large-caliber vessel that trifurcates to LAD, small ramus and left circumflex  Ramus intermedius: Small in caliber  The left anterior descending coronary artery: Large-caliber transapical vessel with severe 99% mid stenosis around the takeoff of the large diagonal branch that in turn has severe 99% ostial stenosis status post complex PCI as detailed below.  Gives rise to medium caliber second diagonal branch with severe 90% ostial stenosis status post PTCA.  The left circumflex coronary artery: Large-caliber vessel that gives rise to a large OM with diffuse obstructive CAD.  True circumflex tapers into the AV groove  The right coronary artery: Large-caliber dominant vessel with mild CAD    Complex PERCUTANEOUS CORONARY INTERVENTION of LAD/first diagonal branch using culotte bifurcation stenting technique:  Pre: 99% stenosis and JARED I flow  Post: 0% residual stenosis and JARED III flow.   Guide Catheter(s): 6 Syriac EBU 3.5, Guidezilla  Guidewire(s): Run-through,  50  Anticoagulation:  Heparin to maintain ACT > 250  Antiplatelet(s): Aspirin, Plavix  Pre-dilation balloon(s): 2 x 12 mm, 3 x 12 mm NC, 3.5 x 12 mm NC  IVUS or OCT used: IVUS guided  Intracoronary Atherectomy / Lithotripsy: None  Stent: Synergy 3 x 28 mm HENRY into the first diagonal branch, Synergy 3.5 x 28 mm HENRY in the proximal/mid LAD  Post-dilation balloon(s): 3 x 12 mm NC, 3.5 x 12 mm NC, 4 x 12 mm NC    Final kissing  balloon inflation was performed using two 3 x 12 mm NC balloons with excellent results    Then the second diagonal branch was wired using a  50 guidewire and successful PTCA of his ostium was performed using 2 x 12 mm balloon to high pressure with < 50% residual stenosis.     No dissection, signs of no-reflow, distal embolization or perforation post PCI.    Closing: At completion of the procedure the relevant equipment was removed from the body and hemostasis achieved by Radial band for the right radial arteriotomy site.    The patient left the cath lab  CP free,  hemodynamically stable and neurologically intact.      IMPRESSION:  1.  Severe 99% subtotal occluded mid LAD around the takeoff of a large diagonal branch status post successful IVUS guided complex PCI using culotte bifurcation stenting technique deploying Synergy 3 x 28 mm HENRY into the first diagonal branch, Synergy 3.5 x 28 mm HENRY in the proximal/mid LAD post-dilated to ~ 4-4.2mm  2.  Successful PTCA of the ostium of the medium caliber second diagonal branch  3.  Residual nonobstructive CAD in the left circumflex and RCA epicardial coronary arteries  4.  Elevated resting LVEDP 20 mmHg with no significant transaortic gradient on pullback    RECOMMENDATIONS:  Dual antiplatelet therapy for at least 6 months then consider Plavix 75 mg monotherapy for life as tolerated  HI statin / PCSK9-I: Target LDL < 55 and TG < 150  GDMT and lifestyle modifications for secondary ASCVD prevention  Cardiac Rehab referral  TR band protocol  Post PCI EKG, IV fluids    NOTIFICATION:  The patient's spouse was called and notified of the results.    Referring cardiology provider -Marii HOLLAND- was notified.    Angel De Jesus MD, MPH [facility]  Interventional Cardiologist  [facility]   of Clinical Internal Medicine - [facility] Shukri SMITH

## 2025-07-14 NOTE — PROGRESS NOTES
Pt arrived at 1334. RN received report from cath lab RN. Right arterial sheath removal site has  TR band in placed, CDI, soft and tender. Pulse +2. RN called and updated wife, Royer.   1345: EKG done at bedside. Royer at bedside.  1413: Pt tolerated PO fluids.  1549: RN removed 2cc in TR band. RN noticed small, puffy area distal to puncture site. RN held pressure for 5 minutes and put 1cc back in the TR band. Area is soft and tender after holding pressure.   1619: RN removed all cc's from TR band. Site is soft and tender. RN went over d/c instructions. All questions/concerns answered.  1634: RN removed TR band and put guaze and tegaderm. Site is CDI, soft and tender. Pt ambulated without assistance. No c/o of dizziness. RN removed IV and ID band.   1654: pt d/c via wheelchair. All belongings accounted for.

## 2025-07-14 NOTE — DISCHARGE INSTRUCTIONS
POST ANGIOGRAM  General Care Instructions  Maintain a bandage over the incision site for 24 hours.  It's normal to find a small bruise or dime-sized lump at the insertion site. This should disappear within a few weeks.  Do not apply lotions or powders to the site.  Do not immerse the catheter insertion site in water (bathtub/swimming) for five days. It is ok to shower 24 hours after the procedure.  You may resume your normal diet immediately; on the day of your procedure, drink 6-10 glasses of water to help flush the contrast liquid out of your system.  If the doctor inserted the catheter in through your groin:  Walking short distances on a flat surface is OK. Limit going up/down stairs for the first 2 days.  DO NOT do yard work, drive, squat, lift heavy objects, or play sports for 2 days; or until your health care provider tells you it is OK.  If the doctor inserted the catheter in your arm:  For 3 days, DO NOT lift anything heavier than 10 pounds (approximately a gallon of milk). DO NOT do any heavy pushing, pulling, or twisting.    Medications  If your current medications need to be changed, you will be provided with an updated list of your medications prior to discharge.  If you take warfarin (Coumadin), resume taking your usual dose the evening after the procedure.  DO NOT STOP taking prescribed blood thinning (anti-platelet) medications unless instructed by your cardiologist.  These medications include:  Aspirin, Clopidogrel (Plavix), Ticagrelor (Brilinta), or Prasugrel (Effient)   If you take one of the following anticoagulants, RESUME 24 HOURS after your procedure:  Apixiban (Eliquis), Rivaroxaban (Xarelto), Dabigatran (Pradaxa), Edoxaban (Savaysa)  If you take metformin (Glucophage), RESUME 48 HOURS after your procedure.    When to call your healthcare provider  Call your cardiologist right away at [phone] if you have any of the following:   Problems/Concerns taking any of your prescribed heart  medicines.   The insertion site has increasing pain, swelling, redness, bleeding, or drainage.   Your arm or leg below where the insertion site changes color, is cool, or is numb.   You have chest pain or shortness of breath that does not go away with rest.   Your pulse feels irregular -- very slow (less than 60 beats/minute) or very fast (over 100 beats/minute).   You have dizziness, fainting, or you are very tired.   You are coughing up blood or yellow or green mucus.   You have chills or a fever over 101°F (38.3°C).    If there is bleeding at the catheter insertion site, apply pressure for 10 minutes.  If bleeding persists, call 911, and continue to hold pressure until advanced medical support arrives.        Exercising Safely After Percutaneous Coronary Intervention (PCI)  After percutaneous coronary intervention (PCI), which involves angioplasty and often stenting, it's important to focus on your heart health. Exercise can help strengthen your heart. It can also help you feel good and improve your overall health. Talk with your health care provider or cardiac rehab team member about good options for you.  Start slowly. Work up to more vigorous exercise as you get stronger. Aim for at least 150 minutes of exercise each week.  Include aerobic activities. These make the heart beat faster. They work the heart and lungs, and improve the body's ability to use oxygen. Good choices include walking, swimming, and biking .  Always follow your doctor's recommendation for exercise.   You have been referred to cardiac rehabilitation, which is important for your recovery.  You may contact [facility]'s Intensive Cardiac Rehab Program at [phone] to learn more and schedule a visit.        Lifestyle Management After Percutaneous Coronary Intervention (PCI)  Percutaneous coronary intervention (PCI)  involves angioplasty and often stenting. This procedure can open arteries and relieve symptoms. But, it doesn't cure coronary artery  disease. New blockages can still form. You need to take steps to prevent this by managing risk factors. Doing so will help make your heart and arteries healthier. Your doctor may prescribe cardiac rehabilitation to help with this lifelong process.  Understanding risk factors  Some risk factors for coronary artery disease can be controlled. These include smoking, high blood pressure, cholesterol, diabetes, and obesity. They can be managed with medication, diet, and exercise. Support and counseling can also play a role. The effort will pay off! Managing risk factors can help you be more active, feel better, and reduce the risk of heart attack.    If you smoke, quit!  If your doctor has been urging you to quit smoking, it's for good reasons. Smoking damages your heart, blood vessels, and lungs. The good news is that quitting can halt or even reverse the damage of smoking. To quit now:  Get medical help. Ask your doctor for advice on stop-smoking programs. Also ask about medications or nicotine replacement therapy products that may help you quit smoking.  Get support. Join a support group. Ask for help from your family and friends.  Don't give up. It often takes several tries to succeed in quitting smoking.  Avoid secondhand smoke. Ask family and friends not to smoke around you.        What to Expect Post Sedation    Rest and take it easy for the first 24 hours.  A responsible adult is recommended to remain with you during that time.  It is normal to feel sleepy.  We encourage you to not do anything that requires balance, judgment or coordination.    FOR 24 HOURS DO NOT:  Drive, operate machinery or run household appliances.  Drink beer or alcoholic beverages.  Make important decisions or sign legal documents.    To avoid nausea, slowly advance diet as tolerated, avoiding spicy or greasy foods for the first day.  Add more substantial food to your diet according to your provider's instructions.  INCREASE FLUIDS AND FIBER  TO AVOID CONSTIPATION.    MILD FLU-LIKE SYMPTOMS ARE NORMAL.  YOU MAY EXPERIENCE GENERALIZED MUSCLE ACHES, THROAT IRRITATION, HEADACHE AND/OR SOME NAUSEA.  If any questions arise, call your provider.  If your provider is not available, please feel free to call the Surgical Center at [phone].    MEDICATIONS: Resume taking daily medication.  Take prescribed pain medication with food.  If no medication is prescribed, you may take non-aspirin pain medication if needed.  PAIN MEDICATION CAN BE VERY CONSTIPATING.  Take a stool softener or laxative such as senokot, pericolace, or milk of magnesia if needed.      Diet    Resume your normal diet as tolerated.  A diet low in cholesterol, fat, and sodium is recommended for good health.       BOWEL FUNCTION:  If you are having problems, use what you normally would or call your provider for suggestions. It also helps to stay regular by including fiber in your diet (for example: bran or fruits and vegetables) and drink plenty of liquids (water, juice, etc.).    Take Aspirin 81mg and Plavix 75mg once a day, starting 7/15/25.

## 2025-07-14 NOTE — H&P
History:  Primary Diagnosis: Abnormal stress test      HPI:  Erasto Aguilar patient of Marii HOLLAND, with significant history of Parkinson's disease, HLD, SAMMY, and elevated CAC score of 678. Recent cardiac stress imaging showed: Reversible ischemia anterior and anteroseptal ventricle walls. Paradoxical motion of the anterior and apical walls. Ejection fraction 43%. TID: 1.16.   Recent echo shows: The left ventricular ejection fraction is visually estimated to be   52%. Mild aortic insufficiency. Estimated right ventricular systolic pressure is 20 mmHg.    Currently patient denies chest pain or palpitations. No shortness of breath, dyspnea on exertion, orthopnea or PND. No lower extremity edema. No dizziness or lightheadedness. No syncope or presyncope. Patient has no current complaints.     Not taking ED meds at home  Not taking OAC at home   Ride home is Maia, wife  NPO since 1800 yesterday  Not anemic   Cr below baseline at 1.29  Full code  No allergy to contrast   No upcoming surgery     Medical history: Past Medical HistoryPast Medical History:  Diagnosis Date    Anesthesia     PONV    Anginal syndrome (HCC)     after strenuous exercise    Breath shortness     Bursitis     Dilated aortic root (HCC) 03/2024    Echocardiogram with ascending aorta 3.6cm. Aortic root 3.7cm.    Heart valve disease     High cholesterol     Hyperlipidemia 03/2024    CTCS score of 678.    Knee pain     left knee    Muscle disorder     Parkinson's Disease / Patient not on medication currently    SAMMY (obstructive sleep apnea)     lost weight, no device    Parkinson disease (HCC)     PONV (postoperative nausea and vomiting)     Prediabetes 02/15/2023    Renal disorder     renal insufficiency    Snoring     occasionally    Tremors of nervous system     right side       Surgical history: Past Surgical History[1]    Social history: Tobacco Use History[2]   Social History     Substance and Sexual Activity   Alcohol Use Yes    Comment: 2  glasses per month      Social History     Substance and Sexual Activity   Drug Use Yes    Comment: topical CBD/THC to knee        Family history:   Family History   Problem Relation Age of Onset    Parkinson's Disease Mother     Alzheimer's Disease Father        Allergies: Allergies[3]    Home medications: Current Medications[4]    Review of Systems:  Review of Systems   Constitutional:  Negative for chills and fever.   HENT:  Negative for congestion, hearing loss, nosebleeds and sore throat.    Eyes:  Negative for blurred vision, double vision and pain.   Respiratory:  Negative for cough, shortness of breath and wheezing.    Cardiovascular:  Negative for chest pain, palpitations, orthopnea, claudication, leg swelling and PND.   Gastrointestinal:  Negative for abdominal pain, blood in stool, constipation, diarrhea, heartburn and vomiting.   Genitourinary:  Negative for dysuria and urgency.   Musculoskeletal:  Positive for neck pain. Negative for myalgias. Joint pain: knee pain.  Neurological:  Positive for tremors (parkinson's). Negative for dizziness, sensory change, focal weakness, seizures, weakness and headaches.       Physical Examination:  Physical Exam  HENT:      Mouth/Throat:      Mouth: Mucous membranes are moist.   Cardiovascular:      Rate and Rhythm: Normal rate and regular rhythm.      Pulses: Normal pulses.      Heart sounds: Normal heart sounds.   Pulmonary:      Effort: Pulmonary effort is normal.      Breath sounds: Normal breath sounds.   Abdominal:      Palpations: Abdomen is soft.      Tenderness: There is no abdominal tenderness. There is no guarding.   Musculoskeletal:      Right lower leg: No edema.      Left lower leg: No edema.   Skin:     General: Skin is warm.   Neurological:      Mental Status: He is alert.       Impression:  Abnormal stress test    Plan:  Left cardiac cath with possible intervention.    The risks/benefits of the procedure will be further discussed with the consenting  physician performing the procedure.     Thank you for allowing me to participate in the care of Erasto Aguilar .    Loren Berger PA-C, Cardiology  [facility] Heart and Vascular [facility], [facility]  [address] [address]  [place] [address]  Phone: [phone]  Fax: [phone]    PLEASE NOTE: This note was created using voice recognition software. I have made every reasonable attempt to correct obvious errors, but I expect that there are errors of grammar and possibly content that I did not discover before finalizing the note.            [1]   Past Surgical History:  Procedure Laterality Date    HAMSTRING TENDON REPAIR Left 8/25/2022    Procedure: REPAIR OF LEFT KNEE QUADRICEPS TENDON RUPTURE;  Surgeon: Eitan Jay M.D.;  Location: SURGERY [facility];  Service: Orthopedics    CHOLECYSTECTOMY     [2]   Social History  Tobacco Use   Smoking Status Never   Smokeless Tobacco Never   [3]   Allergies  Allergen Reactions    Pcn [Penicillins] Anaphylaxis     Had throat swelling with penicillin around 30 years ago.   [4] No current facility-administered medications for this encounter.

## 2025-07-15 ENCOUNTER — TELEPHONE (OUTPATIENT)
Dept: CARDIOLOGY | Facility: MEDICAL CENTER | Age: 70
End: 2025-07-15
Payer: MEDICARE

## 2025-07-15 PROBLEM — I25.10 CORONARY ARTERY DISEASE INVOLVING NATIVE CORONARY ARTERY OF NATIVE HEART WITHOUT ANGINA PECTORIS: Status: ACTIVE | Noted: 2025-07-15

## 2025-07-15 PROBLEM — Z95.5 S/P DRUG ELUTING CORONARY STENT PLACEMENT: Status: ACTIVE | Noted: 2025-07-15

## 2025-07-15 NOTE — TELEPHONE ENCOUNTER
----- Message from Physician Angel De Jesus M.D. sent  -----  Regarding: post PCI check in  Hi Michelle,    Can someone please call him tomorrow AM to check-in post PCI and ensure he is on aspirin and Plavix.    Thank you.      Phone Number Called: [phone]     Called and s/w pt. Pt is feeling well. No new or worsening sx to report. Insertion site is C/D/I without swelling or pain; it is sensitive to touch but otherwise fine. He is taking both asa and plavix as prescribed.     To AL, thank you!  To AB as DANNIELLEI. Thank you!

## 2025-07-21 NOTE — Clinical Note
REFERRAL APPROVAL NOTICE         Sent on July 21, 2025                   Erasto Aguilar  [address] [facility] [address]                   Dear Mr. Aguilar,    After a careful review of the medical information and benefit coverage, [facility] has processed your referral. See below for additional details.    If applicable, you must be actively enrolled with your insurance for coverage of the authorized service. If you have any questions regarding your coverage, please contact your insurance directly.    REFERRAL INFORMATION   Referral #:  [id number]  Referred-To Department    Referred-By Provider:  Cardiac Intensive Care    Angel De Jesus M.D.   Intensive Card Rehab      [address]  [address]  [facility] [address]  [phone] [address]  [address]  [facility] [address] [phone]    Referral Start Date:  07/14/2025  Referral End Date:   07/14/2026             SCHEDULING  If you do not already have an appointment, please call [phone] to make an appointment.     MORE INFORMATION  If you do not already have a [employer] account, sign up at: [email or web address]  You can access your medical information, make appointments, see lab results, billing information, and more.  If you have questions regarding this referral, please contact  the [facility] Referrals department at:             [phone]. Monday - Friday 8:00AM - 5:00PM.     Sincerely,    [facility]

## 2025-08-06 PROBLEM — Q25.49: Status: ACTIVE | Noted: 2025-08-06

## 2025-08-06 PROBLEM — Z95.820 S/P ANGIOPLASTY WITH STENT: Status: ACTIVE | Noted: 2025-08-06

## 2025-08-13 ENCOUNTER — APPOINTMENT (OUTPATIENT)
Dept: CARDIOLOGY | Facility: PHYSICIAN GROUP | Age: 70
End: 2025-08-13
Payer: MEDICARE

## 2025-08-13 ASSESSMENT — ENCOUNTER SYMPTOMS
PND: 0
COUGH: 0
ORTHOPNEA: 0
LOSS OF CONSCIOUSNESS: 0
FEVER: 0
DIZZINESS: 0
ABDOMINAL PAIN: 0
CHILLS: 0
MYALGIAS: 0
HEADACHES: 0
BRUISES/BLEEDS EASILY: 0
INSOMNIA: 0
PALPITATIONS: 0
SHORTNESS OF BREATH: 0
NAUSEA: 0

## 2025-08-13 ASSESSMENT — FIBROSIS 4 INDEX: FIB4 SCORE: 4.06
